# Patient Record
Sex: FEMALE | Race: OTHER | HISPANIC OR LATINO | URBAN - METROPOLITAN AREA
[De-identification: names, ages, dates, MRNs, and addresses within clinical notes are randomized per-mention and may not be internally consistent; named-entity substitution may affect disease eponyms.]

---

## 2017-12-04 ENCOUNTER — EMERGENCY (EMERGENCY)
Facility: HOSPITAL | Age: 52
LOS: 1 days | Discharge: LEFT BEFORE DISCHARGE | End: 2017-12-04
Admitting: EMERGENCY MEDICINE

## 2017-12-04 VITALS
TEMPERATURE: 97 F | DIASTOLIC BLOOD PRESSURE: 74 MMHG | HEIGHT: 60 IN | SYSTOLIC BLOOD PRESSURE: 115 MMHG | WEIGHT: 169.98 LBS | RESPIRATION RATE: 20 BRPM | HEART RATE: 92 BPM | OXYGEN SATURATION: 96 %

## 2017-12-04 NOTE — ED ADULT NURSE REASSESSMENT NOTE - NS ED NURSE REASSESS COMMENT FT1
Pt stating that she feels better and wants to leave. At this time md merlos had just signed on to patient.  Pt drinking ginerale and tolerating with out any issues.  Pt asked to wait while I go get md merlos and left during this time with her daughter. Md merlos made aware

## 2017-12-04 NOTE — ED ADULT NURSE NOTE - OBJECTIVE STATEMENT
Pt axox3 c/o abdominal pain starting today. Pt asking for water and told not to have anything to drink, until she is assessed by md, pt found drinking several gingerales

## 2017-12-04 NOTE — ED ADULT TRIAGE NOTE - CHIEF COMPLAINT QUOTE
Patient BIBA to ED today with c/o abdominal pain and n/v.  Patient was at work today when symptoms started.

## 2017-12-04 NOTE — ED PROVIDER NOTE - PROGRESS NOTE DETAILS
I went to go see patient- was informed by nurse that patient was feelign completely better and wanted to leave.  She LWOBE

## 2019-11-12 NOTE — ED ADULT TRIAGE NOTE - LANGUAGE ASSISTANCE NEEDED
Yes-Patient/Caregiver accepts free interpretation services... Imiquimod Pregnancy And Lactation Text: This medication is Pregnancy Category C. It is unknown if this medication is excreted in breast milk.

## 2020-01-21 ENCOUNTER — APPOINTMENT (OUTPATIENT)
Dept: ANTEPARTUM | Facility: CLINIC | Age: 55
End: 2020-01-21

## 2020-01-28 ENCOUNTER — ASOB RESULT (OUTPATIENT)
Age: 55
End: 2020-01-28

## 2020-01-28 ENCOUNTER — APPOINTMENT (OUTPATIENT)
Dept: ANTEPARTUM | Facility: CLINIC | Age: 55
End: 2020-01-28
Payer: COMMERCIAL

## 2020-01-28 PROCEDURE — 76830 TRANSVAGINAL US NON-OB: CPT

## 2020-01-28 PROCEDURE — 76856 US EXAM PELVIC COMPLETE: CPT | Mod: 59

## 2022-04-02 ENCOUNTER — INPATIENT (INPATIENT)
Facility: HOSPITAL | Age: 57
LOS: 1 days | Discharge: HOME | DRG: 392 | End: 2022-04-04
Attending: INTERNAL MEDICINE | Admitting: INTERNAL MEDICINE
Payer: COMMERCIAL

## 2022-04-02 VITALS
SYSTOLIC BLOOD PRESSURE: 98 MMHG | TEMPERATURE: 99 F | DIASTOLIC BLOOD PRESSURE: 42 MMHG | HEIGHT: 60 IN | OXYGEN SATURATION: 100 % | WEIGHT: 160.06 LBS | RESPIRATION RATE: 18 BRPM | HEART RATE: 84 BPM

## 2022-04-02 DIAGNOSIS — Z98.891 HISTORY OF UTERINE SCAR FROM PREVIOUS SURGERY: Chronic | ICD-10-CM

## 2022-04-02 LAB
ALBUMIN SERPL ELPH-MCNC: 4.2 G/DL — SIGNIFICANT CHANGE UP (ref 3.3–5.2)
ALP SERPL-CCNC: 209 U/L — HIGH (ref 40–120)
ALT FLD-CCNC: 74 U/L — HIGH
ANION GAP SERPL CALC-SCNC: 16 MMOL/L — SIGNIFICANT CHANGE UP (ref 5–17)
AST SERPL-CCNC: 66 U/L — HIGH
BASE EXCESS BLDV CALC-SCNC: -1.9 MMOL/L — SIGNIFICANT CHANGE UP (ref -2–3)
BASOPHILS # BLD AUTO: 0.06 K/UL — SIGNIFICANT CHANGE UP (ref 0–0.2)
BASOPHILS NFR BLD AUTO: 0.4 % — SIGNIFICANT CHANGE UP (ref 0–2)
BILIRUB SERPL-MCNC: 1 MG/DL — SIGNIFICANT CHANGE UP (ref 0.4–2)
BUN SERPL-MCNC: 8.8 MG/DL — SIGNIFICANT CHANGE UP (ref 8–20)
CALCIUM SERPL-MCNC: 9.4 MG/DL — SIGNIFICANT CHANGE UP (ref 8.6–10.2)
CHLORIDE SERPL-SCNC: 95 MMOL/L — LOW (ref 98–107)
CO2 SERPL-SCNC: 22 MMOL/L — SIGNIFICANT CHANGE UP (ref 22–29)
CREAT SERPL-MCNC: 0.39 MG/DL — LOW (ref 0.5–1.3)
EGFR: 116 ML/MIN/1.73M2 — SIGNIFICANT CHANGE UP
EOSINOPHIL # BLD AUTO: 0.12 K/UL — SIGNIFICANT CHANGE UP (ref 0–0.5)
EOSINOPHIL NFR BLD AUTO: 0.9 % — SIGNIFICANT CHANGE UP (ref 0–6)
FLUAV AG NPH QL: SIGNIFICANT CHANGE UP
FLUBV AG NPH QL: SIGNIFICANT CHANGE UP
GAS PNL BLDV: SIGNIFICANT CHANGE UP
GLUCOSE SERPL-MCNC: 208 MG/DL — HIGH (ref 70–99)
HCO3 BLDV-SCNC: 24 MMOL/L — SIGNIFICANT CHANGE UP (ref 22–29)
HCT VFR BLD CALC: 40.6 % — SIGNIFICANT CHANGE UP (ref 34.5–45)
HCT VFR BLD CALC: 46.2 % — HIGH (ref 34.5–45)
HGB BLD-MCNC: 13.7 G/DL — SIGNIFICANT CHANGE UP (ref 11.5–15.5)
HGB BLD-MCNC: 15.7 G/DL — HIGH (ref 11.5–15.5)
IMM GRANULOCYTES NFR BLD AUTO: 0.5 % — SIGNIFICANT CHANGE UP (ref 0–1.5)
LACTATE BLDV-MCNC: 2.7 MMOL/L — HIGH (ref 0.5–2)
LACTATE SERPL-SCNC: 3 MMOL/L — HIGH (ref 0.5–2)
LIDOCAIN IGE QN: 74 U/L — HIGH (ref 22–51)
LYMPHOCYTES # BLD AUTO: 1.03 K/UL — SIGNIFICANT CHANGE UP (ref 1–3.3)
LYMPHOCYTES # BLD AUTO: 7.3 % — LOW (ref 13–44)
MCHC RBC-ENTMCNC: 30.4 PG — SIGNIFICANT CHANGE UP (ref 27–34)
MCHC RBC-ENTMCNC: 30.6 PG — SIGNIFICANT CHANGE UP (ref 27–34)
MCHC RBC-ENTMCNC: 33.7 GM/DL — SIGNIFICANT CHANGE UP (ref 32–36)
MCHC RBC-ENTMCNC: 34 GM/DL — SIGNIFICANT CHANGE UP (ref 32–36)
MCV RBC AUTO: 89.4 FL — SIGNIFICANT CHANGE UP (ref 80–100)
MCV RBC AUTO: 90.6 FL — SIGNIFICANT CHANGE UP (ref 80–100)
MONOCYTES # BLD AUTO: 0.57 K/UL — SIGNIFICANT CHANGE UP (ref 0–0.9)
MONOCYTES NFR BLD AUTO: 4 % — SIGNIFICANT CHANGE UP (ref 2–14)
NEUTROPHILS # BLD AUTO: 12.24 K/UL — HIGH (ref 1.8–7.4)
NEUTROPHILS NFR BLD AUTO: 86.9 % — HIGH (ref 43–77)
PCO2 BLDV: 43 MMHG — HIGH (ref 39–42)
PH BLDV: 7.35 — SIGNIFICANT CHANGE UP (ref 7.32–7.43)
PLATELET # BLD AUTO: 209 K/UL — SIGNIFICANT CHANGE UP (ref 150–400)
PLATELET # BLD AUTO: 319 K/UL — SIGNIFICANT CHANGE UP (ref 150–400)
PO2 BLDV: 46 MMHG — HIGH (ref 25–45)
POTASSIUM SERPL-MCNC: 4.1 MMOL/L — SIGNIFICANT CHANGE UP (ref 3.5–5.3)
POTASSIUM SERPL-SCNC: 4.1 MMOL/L — SIGNIFICANT CHANGE UP (ref 3.5–5.3)
PROT SERPL-MCNC: 7.1 G/DL — SIGNIFICANT CHANGE UP (ref 6.6–8.7)
RBC # BLD: 4.48 M/UL — SIGNIFICANT CHANGE UP (ref 3.8–5.2)
RBC # BLD: 5.17 M/UL — SIGNIFICANT CHANGE UP (ref 3.8–5.2)
RBC # FLD: 13.2 % — SIGNIFICANT CHANGE UP (ref 10.3–14.5)
RBC # FLD: 13.4 % — SIGNIFICANT CHANGE UP (ref 10.3–14.5)
RSV RNA NPH QL NAA+NON-PROBE: SIGNIFICANT CHANGE UP
SAO2 % BLDV: 80.5 % — SIGNIFICANT CHANGE UP
SARS-COV-2 RNA SPEC QL NAA+PROBE: SIGNIFICANT CHANGE UP
SODIUM SERPL-SCNC: 133 MMOL/L — LOW (ref 135–145)
TROPONIN T SERPL-MCNC: <0.01 NG/ML — SIGNIFICANT CHANGE UP (ref 0–0.06)
WBC # BLD: 13.92 K/UL — HIGH (ref 3.8–10.5)
WBC # BLD: 14.09 K/UL — HIGH (ref 3.8–10.5)
WBC # FLD AUTO: 13.92 K/UL — HIGH (ref 3.8–10.5)
WBC # FLD AUTO: 14.09 K/UL — HIGH (ref 3.8–10.5)

## 2022-04-02 PROCEDURE — G1004: CPT

## 2022-04-02 PROCEDURE — 74177 CT ABD & PELVIS W/CONTRAST: CPT | Mod: 26,ME

## 2022-04-02 PROCEDURE — 99285 EMERGENCY DEPT VISIT HI MDM: CPT

## 2022-04-02 RX ORDER — METRONIDAZOLE 500 MG
500 TABLET ORAL ONCE
Refills: 0 | Status: COMPLETED | OUTPATIENT
Start: 2022-04-02 | End: 2022-04-02

## 2022-04-02 RX ORDER — SODIUM CHLORIDE 9 MG/ML
1000 INJECTION INTRAMUSCULAR; INTRAVENOUS; SUBCUTANEOUS ONCE
Refills: 0 | Status: COMPLETED | OUTPATIENT
Start: 2022-04-02 | End: 2022-04-02

## 2022-04-02 RX ORDER — ONDANSETRON 8 MG/1
4 TABLET, FILM COATED ORAL ONCE
Refills: 0 | Status: COMPLETED | OUTPATIENT
Start: 2022-04-02 | End: 2022-04-02

## 2022-04-02 RX ORDER — ACETAMINOPHEN 500 MG
975 TABLET ORAL ONCE
Refills: 0 | Status: COMPLETED | OUTPATIENT
Start: 2022-04-02 | End: 2022-04-02

## 2022-04-02 RX ORDER — MORPHINE SULFATE 50 MG/1
4 CAPSULE, EXTENDED RELEASE ORAL ONCE
Refills: 0 | Status: DISCONTINUED | OUTPATIENT
Start: 2022-04-02 | End: 2022-04-02

## 2022-04-02 RX ORDER — CIPROFLOXACIN LACTATE 400MG/40ML
400 VIAL (ML) INTRAVENOUS ONCE
Refills: 0 | Status: COMPLETED | OUTPATIENT
Start: 2022-04-02 | End: 2022-04-02

## 2022-04-02 RX ORDER — CIPROFLOXACIN LACTATE 400MG/40ML
400 VIAL (ML) INTRAVENOUS ONCE
Refills: 0 | Status: DISCONTINUED | OUTPATIENT
Start: 2022-04-02 | End: 2022-04-02

## 2022-04-02 RX ADMIN — SODIUM CHLORIDE 1000 MILLILITER(S): 9 INJECTION INTRAMUSCULAR; INTRAVENOUS; SUBCUTANEOUS at 22:46

## 2022-04-02 RX ADMIN — ONDANSETRON 4 MILLIGRAM(S): 8 TABLET, FILM COATED ORAL at 14:28

## 2022-04-02 RX ADMIN — Medication 100 MILLIGRAM(S): at 20:18

## 2022-04-02 RX ADMIN — MORPHINE SULFATE 4 MILLIGRAM(S): 50 CAPSULE, EXTENDED RELEASE ORAL at 14:30

## 2022-04-02 RX ADMIN — SODIUM CHLORIDE 1000 MILLILITER(S): 9 INJECTION INTRAMUSCULAR; INTRAVENOUS; SUBCUTANEOUS at 18:00

## 2022-04-02 RX ADMIN — Medication 200 MILLIGRAM(S): at 19:18

## 2022-04-02 RX ADMIN — Medication 400 MILLIGRAM(S): at 21:15

## 2022-04-02 RX ADMIN — SODIUM CHLORIDE 1000 MILLILITER(S): 9 INJECTION INTRAMUSCULAR; INTRAVENOUS; SUBCUTANEOUS at 14:28

## 2022-04-02 RX ADMIN — Medication 500 MILLIGRAM(S): at 21:16

## 2022-04-02 RX ADMIN — SODIUM CHLORIDE 1000 MILLILITER(S): 9 INJECTION INTRAMUSCULAR; INTRAVENOUS; SUBCUTANEOUS at 21:15

## 2022-04-02 RX ADMIN — MORPHINE SULFATE 4 MILLIGRAM(S): 50 CAPSULE, EXTENDED RELEASE ORAL at 15:10

## 2022-04-02 NOTE — ED PROVIDER NOTE - OBJECTIVE STATEMENT
56 yr old f with hx htn dm hld c section p/w abdominal pain x 1 day. pain is all over radiating to her right flank and most intense on the right side . she reports associated nausea and vomiting x 1 no diarrhea dysuria sick contacts or travel.

## 2022-04-02 NOTE — ED PROVIDER NOTE - PATIENT PORTAL LINK FT
You can access the FollowMyHealth Patient Portal offered by [facility] by registering at the following website: [email or web address]. By joining [employer]’s FollowMyHealth portal, you will also be able to view your health information using other applications (apps) compatible with our system.

## 2022-04-02 NOTE — ED PROVIDER NOTE - PROGRESS NOTE DETAILS
pain improved will give iv antibiotics, recheck lactate wbc after ivf  po trial intact no vomiting or worsening pain with eating

## 2022-04-02 NOTE — ED PROVIDER NOTE - CARE PLAN
Principal Discharge DX:	Acute diverticulitis  Secondary Diagnosis:	Right sided abdominal pain  Secondary Diagnosis:	DM (diabetes mellitus)  Secondary Diagnosis:	HTN (hypertension)  Secondary Diagnosis:	HLD (hyperlipidemia)  Secondary Diagnosis:	NAFLD (nonalcoholic fatty liver disease)  Secondary Diagnosis:	UTI (urinary tract infection)   1

## 2022-04-02 NOTE — ED ADULT TRIAGE NOTE - CHIEF COMPLAINT QUOTE
pt c/o abdominal pain since yesterday and vomiting, pt slightly hypotensive in traige and feels like she is going to pass out per pts son pt c/o abdominal pain since yesterday and vomiting, pt slightly hypotensive in triage and pt feels like she is going to pass out per pts son pt c/o abdominal pain since yesterday and vomiting, pt slightly hypotensive in triage and pt feels like she is going to pass out,

## 2022-04-02 NOTE — ED PROVIDER NOTE - SECONDARY DIAGNOSIS.
HTN (hypertension) NAFLD (nonalcoholic fatty liver disease) Right sided abdominal pain DM (diabetes mellitus) HLD (hyperlipidemia) UTI (urinary tract infection)

## 2022-04-03 ENCOUNTER — TRANSCRIPTION ENCOUNTER (OUTPATIENT)
Age: 57
End: 2022-04-03

## 2022-04-03 DIAGNOSIS — K57.92 DIVERTICULITIS OF INTESTINE, PART UNSPECIFIED, WITHOUT PERFORATION OR ABSCESS WITHOUT BLEEDING: ICD-10-CM

## 2022-04-03 LAB
APPEARANCE UR: CLEAR — SIGNIFICANT CHANGE UP
BILIRUB UR-MCNC: NEGATIVE — SIGNIFICANT CHANGE UP
COLOR SPEC: YELLOW — SIGNIFICANT CHANGE UP
DIFF PNL FLD: NEGATIVE — SIGNIFICANT CHANGE UP
GLUCOSE BLDC GLUCOMTR-MCNC: 135 MG/DL — HIGH (ref 70–99)
GLUCOSE BLDC GLUCOMTR-MCNC: 184 MG/DL — HIGH (ref 70–99)
GLUCOSE UR QL: 250 MG/DL
HCT VFR BLD CALC: 37.2 % — SIGNIFICANT CHANGE UP (ref 34.5–45)
HGB BLD-MCNC: 12.6 G/DL — SIGNIFICANT CHANGE UP (ref 11.5–15.5)
KETONES UR-MCNC: NEGATIVE — SIGNIFICANT CHANGE UP
LEUKOCYTE ESTERASE UR-ACNC: ABNORMAL
MCHC RBC-ENTMCNC: 30.4 PG — SIGNIFICANT CHANGE UP (ref 27–34)
MCHC RBC-ENTMCNC: 33.9 GM/DL — SIGNIFICANT CHANGE UP (ref 32–36)
MCV RBC AUTO: 89.9 FL — SIGNIFICANT CHANGE UP (ref 80–100)
NITRITE UR-MCNC: NEGATIVE — SIGNIFICANT CHANGE UP
PH UR: 7 — SIGNIFICANT CHANGE UP (ref 5–8)
PLATELET # BLD AUTO: 171 K/UL — SIGNIFICANT CHANGE UP (ref 150–400)
PROT UR-MCNC: NEGATIVE — SIGNIFICANT CHANGE UP
RBC # BLD: 4.14 M/UL — SIGNIFICANT CHANGE UP (ref 3.8–5.2)
RBC # FLD: 13.4 % — SIGNIFICANT CHANGE UP (ref 10.3–14.5)
SP GR SPEC: 1 — LOW (ref 1.01–1.02)
UROBILINOGEN FLD QL: NEGATIVE MG/DL — SIGNIFICANT CHANGE UP
WBC # BLD: 12.31 K/UL — HIGH (ref 3.8–10.5)
WBC # FLD AUTO: 12.31 K/UL — HIGH (ref 3.8–10.5)

## 2022-04-03 PROCEDURE — 99236 HOSP IP/OBS SAME DATE HI 85: CPT

## 2022-04-03 PROCEDURE — 99223 1ST HOSP IP/OBS HIGH 75: CPT

## 2022-04-03 RX ORDER — SODIUM CHLORIDE 9 MG/ML
1000 INJECTION, SOLUTION INTRAVENOUS
Refills: 0 | Status: DISCONTINUED | OUTPATIENT
Start: 2022-04-03 | End: 2022-04-04

## 2022-04-03 RX ORDER — MOXIFLOXACIN HYDROCHLORIDE TABLETS, 400 MG 400 MG/1
1 TABLET, FILM COATED ORAL
Qty: 20 | Refills: 0
Start: 2022-04-03 | End: 2022-04-12

## 2022-04-03 RX ORDER — MORPHINE SULFATE 50 MG/1
1 CAPSULE, EXTENDED RELEASE ORAL EVERY 6 HOURS
Refills: 0 | Status: DISCONTINUED | OUTPATIENT
Start: 2022-04-03 | End: 2022-04-04

## 2022-04-03 RX ORDER — PANTOPRAZOLE SODIUM 20 MG/1
40 TABLET, DELAYED RELEASE ORAL
Refills: 0 | Status: DISCONTINUED | OUTPATIENT
Start: 2022-04-03 | End: 2022-04-04

## 2022-04-03 RX ORDER — ENOXAPARIN SODIUM 100 MG/ML
40 INJECTION SUBCUTANEOUS EVERY 24 HOURS
Refills: 0 | Status: DISCONTINUED | OUTPATIENT
Start: 2022-04-03 | End: 2022-04-04

## 2022-04-03 RX ORDER — ACETAMINOPHEN 500 MG
650 TABLET ORAL EVERY 6 HOURS
Refills: 0 | Status: DISCONTINUED | OUTPATIENT
Start: 2022-04-03 | End: 2022-04-04

## 2022-04-03 RX ORDER — DEXTROSE 50 % IN WATER 50 %
25 SYRINGE (ML) INTRAVENOUS ONCE
Refills: 0 | Status: DISCONTINUED | OUTPATIENT
Start: 2022-04-03 | End: 2022-04-04

## 2022-04-03 RX ORDER — GLUCAGON INJECTION, SOLUTION 0.5 MG/.1ML
1 INJECTION, SOLUTION SUBCUTANEOUS ONCE
Refills: 0 | Status: DISCONTINUED | OUTPATIENT
Start: 2022-04-03 | End: 2022-04-04

## 2022-04-03 RX ORDER — CIPROFLOXACIN LACTATE 400MG/40ML
400 VIAL (ML) INTRAVENOUS EVERY 12 HOURS
Refills: 0 | Status: DISCONTINUED | OUTPATIENT
Start: 2022-04-03 | End: 2022-04-03

## 2022-04-03 RX ORDER — HYDROCHLOROTHIAZIDE 25 MG
25 TABLET ORAL DAILY
Refills: 0 | Status: DISCONTINUED | OUTPATIENT
Start: 2022-04-03 | End: 2022-04-03

## 2022-04-03 RX ORDER — METOPROLOL TARTRATE 50 MG
50 TABLET ORAL DAILY
Refills: 0 | Status: DISCONTINUED | OUTPATIENT
Start: 2022-04-03 | End: 2022-04-04

## 2022-04-03 RX ORDER — INSULIN LISPRO 100/ML
VIAL (ML) SUBCUTANEOUS
Refills: 0 | Status: DISCONTINUED | OUTPATIENT
Start: 2022-04-03 | End: 2022-04-04

## 2022-04-03 RX ORDER — METRONIDAZOLE 500 MG
500 TABLET ORAL EVERY 8 HOURS
Refills: 0 | Status: DISCONTINUED | OUTPATIENT
Start: 2022-04-03 | End: 2022-04-04

## 2022-04-03 RX ORDER — SIMVASTATIN 20 MG/1
40 TABLET, FILM COATED ORAL AT BEDTIME
Refills: 0 | Status: DISCONTINUED | OUTPATIENT
Start: 2022-04-03 | End: 2022-04-04

## 2022-04-03 RX ORDER — CIPROFLOXACIN LACTATE 400MG/40ML
400 VIAL (ML) INTRAVENOUS EVERY 12 HOURS
Refills: 0 | Status: DISCONTINUED | OUTPATIENT
Start: 2022-04-03 | End: 2022-04-04

## 2022-04-03 RX ORDER — MORPHINE SULFATE 50 MG/1
2 CAPSULE, EXTENDED RELEASE ORAL ONCE
Refills: 0 | Status: DISCONTINUED | OUTPATIENT
Start: 2022-04-03 | End: 2022-04-03

## 2022-04-03 RX ORDER — DEXTROSE 50 % IN WATER 50 %
12.5 SYRINGE (ML) INTRAVENOUS ONCE
Refills: 0 | Status: DISCONTINUED | OUTPATIENT
Start: 2022-04-03 | End: 2022-04-04

## 2022-04-03 RX ORDER — METRONIDAZOLE 500 MG
1 TABLET ORAL
Qty: 30 | Refills: 0
Start: 2022-04-03 | End: 2022-04-12

## 2022-04-03 RX ORDER — INSULIN LISPRO 100/ML
3 VIAL (ML) SUBCUTANEOUS
Refills: 0 | Status: DISCONTINUED | OUTPATIENT
Start: 2022-04-03 | End: 2022-04-04

## 2022-04-03 RX ORDER — METFORMIN HYDROCHLORIDE 850 MG/1
500 TABLET ORAL
Refills: 0 | Status: DISCONTINUED | OUTPATIENT
Start: 2022-04-03 | End: 2022-04-03

## 2022-04-03 RX ORDER — ONDANSETRON 8 MG/1
4 TABLET, FILM COATED ORAL EVERY 6 HOURS
Refills: 0 | Status: DISCONTINUED | OUTPATIENT
Start: 2022-04-03 | End: 2022-04-04

## 2022-04-03 RX ORDER — DEXTROSE 50 % IN WATER 50 %
15 SYRINGE (ML) INTRAVENOUS ONCE
Refills: 0 | Status: DISCONTINUED | OUTPATIENT
Start: 2022-04-03 | End: 2022-04-04

## 2022-04-03 RX ORDER — MORPHINE SULFATE 50 MG/1
2 CAPSULE, EXTENDED RELEASE ORAL EVERY 6 HOURS
Refills: 0 | Status: DISCONTINUED | OUTPATIENT
Start: 2022-04-03 | End: 2022-04-04

## 2022-04-03 RX ORDER — METRONIDAZOLE 500 MG
500 TABLET ORAL EVERY 8 HOURS
Refills: 0 | Status: DISCONTINUED | OUTPATIENT
Start: 2022-04-03 | End: 2022-04-03

## 2022-04-03 RX ADMIN — SODIUM CHLORIDE 1000 MILLILITER(S): 9 INJECTION INTRAMUSCULAR; INTRAVENOUS; SUBCUTANEOUS at 00:12

## 2022-04-03 RX ADMIN — MORPHINE SULFATE 2 MILLIGRAM(S): 50 CAPSULE, EXTENDED RELEASE ORAL at 00:46

## 2022-04-03 RX ADMIN — Medication 50 MILLIGRAM(S): at 05:27

## 2022-04-03 RX ADMIN — Medication 975 MILLIGRAM(S): at 00:46

## 2022-04-03 RX ADMIN — Medication 975 MILLIGRAM(S): at 00:18

## 2022-04-03 RX ADMIN — SODIUM CHLORIDE 75 MILLILITER(S): 9 INJECTION, SOLUTION INTRAVENOUS at 20:09

## 2022-04-03 RX ADMIN — Medication 200 MILLIGRAM(S): at 18:03

## 2022-04-03 RX ADMIN — Medication 100 MILLIGRAM(S): at 05:27

## 2022-04-03 RX ADMIN — Medication 2: at 18:04

## 2022-04-03 RX ADMIN — Medication 100 MILLIGRAM(S): at 14:12

## 2022-04-03 RX ADMIN — Medication 200 MILLIGRAM(S): at 05:27

## 2022-04-03 RX ADMIN — Medication 500 MILLIGRAM(S): at 06:30

## 2022-04-03 RX ADMIN — MORPHINE SULFATE 2 MILLIGRAM(S): 50 CAPSULE, EXTENDED RELEASE ORAL at 08:42

## 2022-04-03 RX ADMIN — Medication 400 MILLIGRAM(S): at 06:30

## 2022-04-03 RX ADMIN — Medication 10 MILLIGRAM(S): at 05:27

## 2022-04-03 RX ADMIN — SIMVASTATIN 40 MILLIGRAM(S): 20 TABLET, FILM COATED ORAL at 22:01

## 2022-04-03 RX ADMIN — Medication 650 MILLIGRAM(S): at 17:38

## 2022-04-03 RX ADMIN — MORPHINE SULFATE 1 MILLIGRAM(S): 50 CAPSULE, EXTENDED RELEASE ORAL at 14:13

## 2022-04-03 RX ADMIN — MORPHINE SULFATE 2 MILLIGRAM(S): 50 CAPSULE, EXTENDED RELEASE ORAL at 00:18

## 2022-04-03 RX ADMIN — ENOXAPARIN SODIUM 40 MILLIGRAM(S): 100 INJECTION SUBCUTANEOUS at 22:55

## 2022-04-03 RX ADMIN — Medication 25 MILLIGRAM(S): at 05:27

## 2022-04-03 RX ADMIN — Medication 100 MILLIGRAM(S): at 22:01

## 2022-04-03 RX ADMIN — METFORMIN HYDROCHLORIDE 500 MILLIGRAM(S): 850 TABLET ORAL at 05:27

## 2022-04-03 RX ADMIN — Medication 650 MILLIGRAM(S): at 19:25

## 2022-04-03 NOTE — DISCHARGE NOTE PROVIDER - PROVIDER TOKENS
PROVIDER:[TOKEN:[[id number]]] PROVIDER:[TOKEN:[[id number]]],PROVIDER:[TOKEN:[[id number]]]

## 2022-04-03 NOTE — DISCHARGE NOTE PROVIDER - CARE PROVIDERS DIRECT ADDRESSES
,[email or web address]

## 2022-04-03 NOTE — DISCHARGE NOTE PROVIDER - ATTENDING DISCHARGE PHYSICAL EXAMINATION:
ICU Vital Signs Last 24 Hrs  T(C): 37.1 (04 Apr 2022 11:24), Max: 37.9 (03 Apr 2022 23:19)  T(F): 98.8 (04 Apr 2022 11:24), Max: 100.3 (03 Apr 2022 23:19)  HR: 98 (04 Apr 2022 11:24) (69 - 113)  BP: 115/71 (04 Apr 2022 11:24) (115/71 - 147/81)  BP(mean): 93 (03 Apr 2022 20:18) (93 - 93)  ABP: --  ABP(mean): --  RR: 18 (04 Apr 2022 11:24) (16 - 20)  SpO2: 96% (04 Apr 2022 11:24) (95% - 96%)    General: obese; in no acute distress, intermittent pain med requirement  HEENT: MMM, conjunctiva pink and sclera anicteric.  Lungs: Clear bilaterally  Cor: RRR S1, S2 only  Gastrointestinal: Soft, obese, significant right sided abdominal tenderness to superficial palpation, non-distended; Normal bowel sounds; No rebound or guarding or HSM.  JACE: Unable to perform where pt. located in ED.  Extremities: Normal range of motion, No clubbing, cyanosis or edema  Neurological: Alert and oriented x3  Skin: Warm and dry. No obvious rash

## 2022-04-03 NOTE — DISCHARGE NOTE PROVIDER - NSDCMRMEDTOKEN_GEN_ALL_CORE_FT
Cipro 500 mg oral tablet: 1 tab(s) orally 2 times a day   metroNIDAZOLE 500 mg oral tablet: 1 tab(s) orally every 8 hours    acetaminophen 325 mg oral tablet: 2 tab(s) orally every 6 hours, As needed, Mild Pain (1 - 3)  Cipro 500 mg oral tablet: 1 tab(s) orally 2 times a day   enalapril 10 mg oral tablet: 1 tab(s) orally once a day  Maalox Advanced Regular Strength oral suspension: 10 milliliter(s) orally 4 times a day (after meals and at bedtime), As Needed  metoprolol succinate 50 mg oral tablet, extended release: 1 tab(s) orally once a day  metroNIDAZOLE 500 mg oral tablet: 1 tab(s) orally every 8 hours   pantoprazole 40 mg oral delayed release tablet: 1 tab(s) orally once a day (before a meal)  simvastatin 40 mg oral tablet: 1 tab(s) orally once a day (at bedtime)

## 2022-04-03 NOTE — ED CDU PROVIDER INITIAL DAY NOTE - OBJECTIVE STATEMENT
55yo F pmhx HTN, HLD, DMII presenting to ED c/o rlq abdominal pain x 1 day. Pain worse on right side, radiating to right flank with associated vomiting x 1 episode. Found in ED to have cecal diverticulitis, tx with cipro and flagyl. Initial lactate 2.7, repeated after 1L IVF, increased to 3.0. Pt tolerating PO intake but still having abd pain. Decision made by ED team to keep pt in obs to trend labs, give IV abx and rpt lactate. Lactate improved to 2.4 after fluids. Febrile while in ED, given tylenol. Denies diarrhea, melena, cp, sob, dizziness, recent travel, sick contacts.

## 2022-04-03 NOTE — DISCHARGE NOTE PROVIDER - NSDCFUADDAPPT_GEN_ALL_CORE_FT
Follow with GI Dr Cohen in 1 week.   You will need outpatient colonoscopy  Follow with Dr Canela- for fatty liver disease

## 2022-04-03 NOTE — PATIENT PROFILE ADULT - FALL HARM RISK - UNIVERSAL INTERVENTIONS
Bed in lowest position, wheels locked, appropriate side rails in place/Call bell, personal items and telephone in reach/Instruct patient to call for assistance before getting out of bed or chair/Non-slip footwear when patient is out of bed/[place] to call system/Physically safe environment - no spills, clutter or unnecessary equipment/Purposeful Proactive Rounding/Room/bathroom lighting operational, light cord in reach

## 2022-04-03 NOTE — CONSULT NOTE ADULT - SUBJECTIVE AND OBJECTIVE BOX
Patient is a 56y old  Female who presents with a chief complaint of Right sided abdominal pain    HPI: 55 y/o female presents with 2 day h/o right sided abdominal pain. Pt is an extremely poor historian and appears to be of limited intelligence as even with speaking to her in Japanese she failed to understand simple questions such as how long she has had this pain or if she ever had this type of pain before or if she ever had a colonoscopy. Her son who was with her at the bedside was equally useless in terms of obtaining history from the pt. even though he spoke English well as he did not appear to understand these basic questions either, CT here showed a mild uncomplicated right sided diverticulitis as well as a cirrhotic appearing liver likely secondary to NAFLD as she is obese and diabetic.      REVIEW OF SYSTEMS: Limited for reasons outlined above  Constitutional: No fever, weight loss or fatigue  ENMT:  No difficulty hearing, tinnitus, vertigo; No sinus or throat pain  Respiratory: No cough, wheezing, chills or hemoptysis  Cardiovascular: No chest pain, palpitations, dizziness or leg swelling  Gastrointestinal: As per HPI. Worsening abdominal pain with defecation.  Skin: No itching, burning, rashes or lesions   Musculoskeletal: No joint pain or swelling; No muscle, back or extremity pain  Patient has no cardiopulmonary, peripheral vascular, musculoskeletal, dermatological, neurological, gynecological or psychological symptoms or complaints at this time.    PAST MEDICAL & SURGICAL HISTORY:  Diabetes    Hypertension    Hyperlipidemia    H/O  section    Obesity        FAMILY HISTORY:  No pertinent family history in first degree relatives        SOCIAL HISTORY:  Smoking Status: [ ] Current, [ ] Former, [x ] Never  Pack Years: N/A. No ETOH or drug abuse history  as noted in her ED chart.    MEDICATIONS:  MEDICATIONS  (STANDING):  ciprofloxacin   IVPB 400 milliGRAM(s) IV Intermittent every 12 hours  enalapril 10 milliGRAM(s) Oral daily  hydrochlorothiazide 25 milliGRAM(s) Oral daily  metFORMIN 500 milliGRAM(s) Oral two times a day  metoprolol succinate ER 50 milliGRAM(s) Oral daily  metroNIDAZOLE  IVPB 500 milliGRAM(s) IV Intermittent every 8 hours  simvastatin 40 milliGRAM(s) Oral at bedtime    MEDICATIONS  (PRN):      Allergies    penicillin (Unknown)    Intolerances        Vital Signs Last 24 Hrs  T(C): 37.5 (2022 07:52), Max: 38.3 (2022 00:02)  T(F): 99.5 (2022 07:52), Max: 100.9 (2022 00:02)  HR: 93 (2022 07:52) (84 - 124)  BP: 116/55 (2022 07:52) (98/42 - 151/70)  BP(mean): --  RR: 18 (2022 07:52) (18 - 18)  SpO2: 94% (2022 07:52) (94% - 100%)        PHYSICAL EXAM:    General: Well developed; obese; in no acute distress  HEENT: MMM, conjunctiva pink and sclera anicteric.  Lungs: Clear bilaterally  Cor: RRR S1, S2 only  Gastrointestinal: Soft, obese, significant right sided abdominal tenderness to superficial palpation, non-distended; Normal bowel sounds; No rebound or guarding or HSM.  JACE: Unable to perform where pt. located in ED.  Extremities: Normal range of motion, No clubbing, cyanosis or edema  Neurological: Alert and oriented x3  Skin: Warm and dry. No obvious rash      LABS:                        12.6   12.31 )-----------( 171      ( 2022 05:27 )             37.2         133<L>  |  95<L>  |  8.8  ----------------------------<  208<H>  4.1   |  22.0  |  0.39<L>    Ca    9.4      2022 14:24    TPro  7.1  /  Alb  4.2  /  TBili  1.0  /  DBili  x   /  AST  66<H>  /  ALT  74<H>  /  AlkPhos  209<H>            RADIOLOGY & ADDITIONAL STUDIES:   CT results noted above. Patient is a 56y old  Female who presents with a chief complaint of Right sided abdominal pain    HPI: 57 y/o female presents with 2 day h/o right sided abdominal pain. Pt is an extremely poor historian and appears to be of limited intelligence as even with speaking to her in Latvian she failed to understand simple questions such as how long she has had this pain or if she ever had this type of pain before or if she ever had a colonoscopy. Her son who was with her at the bedside was equally useless in terms of obtaining history from the pt. even though he spoke English well, as he did not appear to understand these basic questions either. CT here showed a mild uncomplicated right sided diverticulitis as well as a cirrhotic appearing liver likely secondary to NAFLD as she is obese and diabetic.      REVIEW OF SYSTEMS: Limited for reasons outlined above  Constitutional: No fever, weight loss or fatigue  ENMT:  No difficulty hearing, tinnitus, vertigo; No sinus or throat pain  Respiratory: No cough, wheezing, chills or hemoptysis  Cardiovascular: No chest pain, palpitations, dizziness or leg swelling  Gastrointestinal: As per HPI. Worsening abdominal pain with defecation.  Skin: No itching, burning, rashes or lesions   Musculoskeletal: No joint pain or swelling; No muscle, back or extremity pain  Patient has no cardiopulmonary, peripheral vascular, musculoskeletal, dermatological, neurological, gynecological or psychological symptoms or complaints at this time.    PAST MEDICAL & SURGICAL HISTORY:  Diabetes    Hypertension    Hyperlipidemia    H/O  section    Obesity        FAMILY HISTORY:  No pertinent family history in first degree relatives        SOCIAL HISTORY:  Smoking Status: [ ] Current, [ ] Former, [x ] Never  Pack Years: N/A. No ETOH or drug abuse history  as noted in her ED chart.    MEDICATIONS:  MEDICATIONS  (STANDING):  ciprofloxacin   IVPB 400 milliGRAM(s) IV Intermittent every 12 hours  enalapril 10 milliGRAM(s) Oral daily  hydrochlorothiazide 25 milliGRAM(s) Oral daily  metFORMIN 500 milliGRAM(s) Oral two times a day  metoprolol succinate ER 50 milliGRAM(s) Oral daily  metroNIDAZOLE  IVPB 500 milliGRAM(s) IV Intermittent every 8 hours  simvastatin 40 milliGRAM(s) Oral at bedtime    MEDICATIONS  (PRN):      Allergies    penicillin (Unknown)    Intolerances        Vital Signs Last 24 Hrs  T(C): 37.5 (2022 07:52), Max: 38.3 (2022 00:02)  T(F): 99.5 (2022 07:52), Max: 100.9 (2022 00:02)  HR: 93 (2022 07:52) (84 - 124)  BP: 116/55 (2022 07:52) (98/42 - 151/70)  BP(mean): --  RR: 18 (2022 07:52) (18 - 18)  SpO2: 94% (2022 07:52) (94% - 100%)        PHYSICAL EXAM:    General: Well developed; obese; in no acute distress  HEENT: MMM, conjunctiva pink and sclera anicteric.  Lungs: Clear bilaterally  Cor: RRR S1, S2 only  Gastrointestinal: Soft, obese, significant right sided abdominal tenderness to superficial palpation, non-distended; Normal bowel sounds; No rebound or guarding or HSM.  JACE: Unable to perform where pt. located in ED.  Extremities: Normal range of motion, No clubbing, cyanosis or edema  Neurological: Alert and oriented x3  Skin: Warm and dry. No obvious rash      LABS:                        12.6   12.31 )-----------( 171      ( 2022 05:27 )             37.2         133<L>  |  95<L>  |  8.8  ----------------------------<  208<H>  4.1   |  22.0  |  0.39<L>    Ca    9.4      2022 14:24    TPro  7.1  /  Alb  4.2  /  TBili  1.0  /  DBili  x   /  AST  66<H>  /  ALT  74<H>  /  AlkPhos  209<H>            RADIOLOGY & ADDITIONAL STUDIES:   CT results noted above.

## 2022-04-03 NOTE — ED CDU PROVIDER INITIAL DAY NOTE - MEDICAL DECISION MAKING DETAILS
57yo F presenting with cecal diverticulitis. Rpt cbc in AM, IV abx. fever control. likely dc in morning.

## 2022-04-03 NOTE — CONSULT NOTE ADULT - ASSESSMENT
Right sided abdominal pain with what appears to be uncomplicated right sided diverticulitis on CT scan. Pt. also with incidental finding of cirrhosis likely secondary to NAFLD from obesity and DM and not responsible for her right sided abdominal pain and tenderness. Given the degree of her tenderness she should be admitted with consideration of ID consult for differnt antibiotics other than Cipro and Metronidazole which she is presently receiving. OK for CLD. Eventual OPT colonoscopy in 6 to 8 weeks once this diverticulitis has resolved. It appears doubtful that the pt. has ever had a colonoscopy. Repeat labs ordered for the AM. Right sided abdominal pain with what appears to be uncomplicated right sided diverticulitis on CT scan. Pt. also with incidental finding of cirrhosis likely secondary to NAFLD from obesity and DM and not responsible for her right sided abdominal pain and tenderness. Given the degree of her tenderness she should be admitted with consideration of ID consult for different antibiotics other than Cipro and Metronidazole which she is presently receiving. OK for CLD. Eventual OPT colonoscopy in 6 to 8 weeks once this diverticulitis has resolved. It appears doubtful that the pt. has ever had a colonoscopy. Repeat labs ordered for the AM.

## 2022-04-03 NOTE — DISCHARGE NOTE PROVIDER - NSDCCPCAREPLAN_GEN_ALL_CORE_FT
PRINCIPAL DISCHARGE DIAGNOSIS  Diagnosis: Diverticulitis  Assessment and Plan of Treatment: right, cecal      SECONDARY DISCHARGE DIAGNOSES  Diagnosis: Right sided abdominal pain  Assessment and Plan of Treatment:     Diagnosis: DM (diabetes mellitus)  Assessment and Plan of Treatment:     Diagnosis: HTN (hypertension)  Assessment and Plan of Treatment:     Diagnosis: HLD (hyperlipidemia)  Assessment and Plan of Treatment:      PRINCIPAL DISCHARGE DIAGNOSIS  Diagnosis: Diverticulitis  Assessment and Plan of Treatment: right, cecal      SECONDARY DISCHARGE DIAGNOSES  Diagnosis: Right sided abdominal pain  Assessment and Plan of Treatment:     Diagnosis: DM (diabetes mellitus)  Assessment and Plan of Treatment:     Diagnosis: HTN (hypertension)  Assessment and Plan of Treatment:     Diagnosis: HLD (hyperlipidemia)  Assessment and Plan of Treatment:     Diagnosis: NAFLD (nonalcoholic fatty liver disease)  Assessment and Plan of Treatment:      PRINCIPAL DISCHARGE DIAGNOSIS  Diagnosis: Diverticulitis  Assessment and Plan of Treatment: right, cecal  cont antibiotics  follow with GI  maintain low fiber diet      SECONDARY DISCHARGE DIAGNOSES  Diagnosis: Right sided abdominal pain  Assessment and Plan of Treatment:     Diagnosis: DM (diabetes mellitus)  Assessment and Plan of Treatment: follow Diabetic diet and cont your diabetic medications    Diagnosis: HTN (hypertension)  Assessment and Plan of Treatment:     Diagnosis: HLD (hyperlipidemia)  Assessment and Plan of Treatment:     Diagnosis: NAFLD (nonalcoholic fatty liver disease)  Assessment and Plan of Treatment: follow with liver doctor    Diagnosis: UTI (urinary tract infection)  Assessment and Plan of Treatment: urine cultures are testing. follow results with doctor visit  the antibiotics works for your UTI also

## 2022-04-03 NOTE — DISCHARGE NOTE PROVIDER - HOSPITAL COURSE
admitted with diverticulitis, abd pain  pt requesting PO antibx and wants to go home  it will be AMA   admitted with diverticulitis, abd pain  her nausea/ vomits resolved, WBC trending down well, pain is occasional only.   adv diet now and transition to oral antibiotics to facilitate going home.  pt and son both in agreement with diet modifications and to f/u GI promptly.    Medically stable and agreeable with discharge and follow up plan. Patient was advised to return to ED if any symptoms occur or worsen.  TIME 43 MIN

## 2022-04-03 NOTE — ED ADULT NURSE REASSESSMENT NOTE - NS ED NURSE REASSESS COMMENT FT1
Assuming care from previous RN Payam, pt AOx4, denies SOB, resp even and unlabored, skin warm and dry, color good, denies pain/n/v, patent 20G IV in left AC, pt on observation at this time, pt aware of plan of care, will continue to monitor.

## 2022-04-03 NOTE — ED CDU PROVIDER INITIAL DAY NOTE - NS ED ATTENDING STATEMENT MOD
This was a shared visit with the LADONNA. I reviewed and verified the documentation and independently performed the documented:

## 2022-04-03 NOTE — ED CDU PROVIDER INITIAL DAY NOTE - PHYSICAL EXAMINATION
Gen: No acute distress, non toxic  HEENT: Mucous membranes moist, pink conjunctivae, EOMI  CV: RRR, nl s1/s2.  Resp: CTAB, normal rate and effort  GI: Abdomen soft, +Right sided abd tenderness. No rebound, no guarding  : No CVAT  Neuro: A&O x 3, moving all 4 extremities  MSK: No spine or joint tenderness to palpation  Skin: No rashes. intact and perfused.

## 2022-04-03 NOTE — ED ADULT NURSE REASSESSMENT NOTE - NS ED NURSE REASSESS COMMENT FT1
Pt c/o RUQ abdominal pain.  Medicine PA contacted.  PRN pain medication requested. Pending orders. CTM

## 2022-04-03 NOTE — H&P ADULT - NSHPPHYSICALEXAM_GEN_ALL_CORE
Vital Signs Last 24 Hrs  T(C): 37.5 (04-03-22 @ 07:52), Max: 38.3 (04-03-22 @ 00:02)  T(F): 99.5 (04-03-22 @ 07:52), Max: 100.9 (04-03-22 @ 00:02)  HR: 93 (04-03-22 @ 07:52) (93 - 124)  BP: 116/55 (04-03-22 @ 07:52) (116/55 - 151/70)  BP(mean): --  RR: 18 (04-03-22 @ 07:52) (18 - 18)  SpO2: 94% (04-03-22 @ 07:52) (94% - 97%)    General: obese; in no acute distress, intermittent pain med requirement  HEENT: MMM, conjunctiva pink and sclera anicteric.  Lungs: Clear bilaterally  Cor: RRR S1, S2 only  Gastrointestinal: Soft, obese, significant right sided abdominal tenderness to superficial palpation, non-distended; Normal bowel sounds; No rebound or guarding or HSM.  JACE: Unable to perform where pt. located in ED.  Extremities: Normal range of motion, No clubbing, cyanosis or edema  Neurological: Alert and oriented x3  Skin: Warm and dry. No obvious rash

## 2022-04-03 NOTE — ED CDU PROVIDER INITIAL DAY NOTE - ATTENDING CONTRIBUTION TO CARE
Nona DODGE- 55 Y/O F with h/o dm, htn, hld placed in cdu for diverticulitis. No fever , chills. Pt still has pain  and returns once pain meds wear off. Pt has no known liver problems, dysuria.    Pt is alert, well appearing female, s1s2 normal reg, b/l clear breath sounds, abd s1s2 normal reg, b/l clear breath sounds, abd soft, diffuse tenderness, normal bowel sounds, no cvat b/l, neuro exam aox3, cn 2-12 intact, no focal deficits, skin warm, dry, good turgor    plan to call GI consult, recommend admission  as pt found to have NAFLD and rt sided diverticulitis, will admit to medicine

## 2022-04-03 NOTE — DISCHARGE NOTE PROVIDER - CARE PROVIDER_API CALL
Payam Cohen)  Gastroenterology; Internal Medicine  [address] [facility] [facility] [address]  [place] [address]  Phone: [phone]  Fax: [phone]  Follow Up Time:    Payam Cohen)  Gastroenterology; Internal Medicine  [address] [facility], [address]  [place] [address]  Phone: [phone]  Fax: [phone]  Follow Up Time:     Erika Canela)  Internal Medicine  [address] [place] [address]  Phone: [phone]  Fax: [phone]  Follow Up Time:

## 2022-04-03 NOTE — ED CDU PROVIDER DISPOSITION NOTE - ATTENDING CONTRIBUTION TO CARE
Nona DODGE- 55 Y/O F with h/o dm, htn, hld placed in cdu for diverticulitis. No fever , chills. Pt still has pain  and returns once pain meds wear off. Pt has no known liver problems, dysuria.    Pt is alert, well appearing female, s1s2 normal reg, b/l clear breath sounds, abd s1s2 normal reg, b/l clear breath sounds, abd soft, diffuse tenderness, normal bowel sounds, no cvat b/l, neuro exam aox3, cn 2-12 intact, no focal deficits, skin warm, dry, good turgor    plan to call GI consult, recommend admission  as pt found to have NAFLD and rt sided diverticulitis, will admit to medicine.

## 2022-04-03 NOTE — CHART NOTE - NSCHARTNOTEFT_GEN_A_CORE
PA late entry note    Seen at bedside because Pt expressing she wants to leave AMA.  #[id number] utilized. Explained reasoning for admission, test results, labs and hospital course so far and plan of care to Pt and Daughter Vannesa at bedside. Pt understands and agrees to stay.

## 2022-04-03 NOTE — DISCHARGE NOTE PROVIDER - NSDCFUADDINST_GEN_ALL_CORE_FT
Ambulate well  Avoid fiber rich, fatty foods. Avoid seeds and nuts until GI follow up  Drink plenty of liquids

## 2022-04-03 NOTE — ED CDU PROVIDER INITIAL DAY NOTE - NS ED ROS FT
Gen: denies fever, chills, fatigue, weight loss  Skin: denies rashes, laceration, bruising  HEENT: denies visual changes, ear pain, nasal congestion, throat pain  Respiratory: denies GONZALEZ, SOB, cough, wheezing  Cardiovascular: denies chest pain, palpitations, diaphoresis, LE edema  GI: +Abd pain, +N/V (resolved). Denies diarrhea.   : denies dysuria, frequency, urgency, bowel/bladder incontinence  MSK: denies joint swelling/pain, back pain, neck pain  Neuro: denies headache, dizziness, weakness, numbness  Psych: denies anxiety, depression, SI/HI, visual/auditory hallucinations

## 2022-04-03 NOTE — H&P ADULT - ASSESSMENT
55 y/o female with PMH of DM, HLD, HTN presents with 2 day h/o right sided abdominal pain.  CT here showed a mild uncomplicated right sided diverticulitis as well as a cirrhotic appearing liver likely secondary to NAFLD     # Right Abd pain sec to Right Cecal diverticulitis  treated with CIpro + Flagyl in CDU with CLD and Morphine  No improvement with pain  Escalate antibx to Zosyn  ID consult per GI  Eventual OPT colonoscopy in 6 to 8 weeks once this diverticulitis has resolved.  Tylenol, Morphine, Zofran  IVF    # DM2  insulin regimen    # HTN, HLD  Cont ACEi, BB  Hold HCTZ while hydrating    # Abnormal UA  Urine c/s testing  asymptomatic    # s/p Elevated lactate in ED    Lovenox    Later in the day pt requesting PO antibx and wants to go home.  IT will be an AMA if she leaves, if she stays will get ID consult   55 y/o female with PMH of DM, HLD, HTN presents with 2 day h/o right sided abdominal pain.  CT here showed a mild uncomplicated right sided diverticulitis as well as a cirrhotic appearing liver likely secondary to NAFLD     # Right Abd pain sec to Right Cecal diverticulitis  treated with CIpro + Flagyl in CDU with CLD and Morphine  No improvement with pain  ID consult per GI- Escalate antibx   Eventual OPT colonoscopy in 6 to 8 weeks once this diverticulitis has resolved.  Tylenol, Morphine, Zofran  IVF    # DM2  insulin regimen    # HTN, HLD  Cont ACEi, BB  Hold HCTZ while hydrating    # Abnormal UA  Urine c/s testing  asymptomatic    # s/p Elevated lactate in ED    Lovenox    Later in the day pt requesting PO antibx and wants to go home.  IT will be an AMA if she leaves, if she stays will get ID consult

## 2022-04-03 NOTE — H&P ADULT - HISTORY OF PRESENT ILLNESS
57 y/o female with PMH of DM, HLD, HTN presents with 2 day h/o right sided abdominal pain. Pt is an extremely poor historian. Her son who was with her at the bedside was not helpful either, even though he spoke English well. she reports associated nausea and vomiting x 1 no diarrhea dysuria. CT here showed a mild uncomplicated right sided diverticulitis as well as a cirrhotic appearing liver likely secondary to NAFLD as she is obese and diabetic. She was initially admitted to CDU where she had 1 episode of low grade temp. Today since, pain not improved despite being on antibx, CDU consulted GI who recommended escalating antibx choice and inpt admission    SH- denies habits, lives with family  FH- DM and HTN in family

## 2022-04-03 NOTE — ED ADULT NURSE REASSESSMENT NOTE - NS ED NURSE REASSESS COMMENT FT1
Dr Martinez at bedside for evaluation, pt continues to c/o discomfort, awaiting additional orders, son @ bedside, will continue to monitor.

## 2022-04-04 ENCOUNTER — TRANSCRIPTION ENCOUNTER (OUTPATIENT)
Age: 57
End: 2022-04-04

## 2022-04-04 VITALS
RESPIRATION RATE: 18 BRPM | DIASTOLIC BLOOD PRESSURE: 71 MMHG | HEART RATE: 98 BPM | SYSTOLIC BLOOD PRESSURE: 115 MMHG | TEMPERATURE: 99 F | OXYGEN SATURATION: 96 %

## 2022-04-04 DIAGNOSIS — K74.60 UNSPECIFIED CIRRHOSIS OF LIVER: ICD-10-CM

## 2022-04-04 DIAGNOSIS — R10.9 UNSPECIFIED ABDOMINAL PAIN: ICD-10-CM

## 2022-04-04 LAB
A1C WITH ESTIMATED AVERAGE GLUCOSE RESULT: 8.2 % — HIGH (ref 4–5.6)
ANION GAP SERPL CALC-SCNC: 14 MMOL/L — SIGNIFICANT CHANGE UP (ref 5–17)
BASOPHILS # BLD AUTO: 0.04 K/UL — SIGNIFICANT CHANGE UP (ref 0–0.2)
BASOPHILS NFR BLD AUTO: 0.4 % — SIGNIFICANT CHANGE UP (ref 0–2)
BUN SERPL-MCNC: 3.6 MG/DL — LOW (ref 8–20)
CALCIUM SERPL-MCNC: 8.4 MG/DL — LOW (ref 8.6–10.2)
CHLORIDE SERPL-SCNC: 104 MMOL/L — SIGNIFICANT CHANGE UP (ref 98–107)
CO2 SERPL-SCNC: 19 MMOL/L — LOW (ref 22–29)
CREAT SERPL-MCNC: 0.28 MG/DL — LOW (ref 0.5–1.3)
CULTURE RESULTS: SIGNIFICANT CHANGE UP
EGFR: 126 ML/MIN/1.73M2 — SIGNIFICANT CHANGE UP
EOSINOPHIL # BLD AUTO: 0.1 K/UL — SIGNIFICANT CHANGE UP (ref 0–0.5)
EOSINOPHIL NFR BLD AUTO: 0.9 % — SIGNIFICANT CHANGE UP (ref 0–6)
ESTIMATED AVERAGE GLUCOSE: 189 MG/DL — HIGH (ref 68–114)
GLUCOSE BLDC GLUCOMTR-MCNC: 192 MG/DL — HIGH (ref 70–99)
GLUCOSE BLDC GLUCOMTR-MCNC: 276 MG/DL — HIGH (ref 70–99)
GLUCOSE SERPL-MCNC: 165 MG/DL — HIGH (ref 70–99)
HCT VFR BLD CALC: 36.6 % — SIGNIFICANT CHANGE UP (ref 34.5–45)
HCV AB S/CO SERPL IA: 0.06 S/CO — SIGNIFICANT CHANGE UP (ref 0–0.99)
HCV AB SERPL-IMP: SIGNIFICANT CHANGE UP
HGB BLD-MCNC: 12.5 G/DL — SIGNIFICANT CHANGE UP (ref 11.5–15.5)
IMM GRANULOCYTES NFR BLD AUTO: 0.5 % — SIGNIFICANT CHANGE UP (ref 0–1.5)
LYMPHOCYTES # BLD AUTO: 1.03 K/UL — SIGNIFICANT CHANGE UP (ref 1–3.3)
LYMPHOCYTES # BLD AUTO: 9.3 % — LOW (ref 13–44)
MCHC RBC-ENTMCNC: 30.8 PG — SIGNIFICANT CHANGE UP (ref 27–34)
MCHC RBC-ENTMCNC: 34.2 GM/DL — SIGNIFICANT CHANGE UP (ref 32–36)
MCV RBC AUTO: 90.1 FL — SIGNIFICANT CHANGE UP (ref 80–100)
MONOCYTES # BLD AUTO: 0.78 K/UL — SIGNIFICANT CHANGE UP (ref 0–0.9)
MONOCYTES NFR BLD AUTO: 7.1 % — SIGNIFICANT CHANGE UP (ref 2–14)
NEUTROPHILS # BLD AUTO: 9.02 K/UL — HIGH (ref 1.8–7.4)
NEUTROPHILS NFR BLD AUTO: 81.8 % — HIGH (ref 43–77)
PLATELET # BLD AUTO: 165 K/UL — SIGNIFICANT CHANGE UP (ref 150–400)
POTASSIUM SERPL-MCNC: 3.4 MMOL/L — LOW (ref 3.5–5.3)
POTASSIUM SERPL-SCNC: 3.4 MMOL/L — LOW (ref 3.5–5.3)
RBC # BLD: 4.06 M/UL — SIGNIFICANT CHANGE UP (ref 3.8–5.2)
RBC # FLD: 13.4 % — SIGNIFICANT CHANGE UP (ref 10.3–14.5)
SODIUM SERPL-SCNC: 137 MMOL/L — SIGNIFICANT CHANGE UP (ref 135–145)
SPECIMEN SOURCE: SIGNIFICANT CHANGE UP
WBC # BLD: 11.02 K/UL — HIGH (ref 3.8–10.5)
WBC # FLD AUTO: 11.02 K/UL — HIGH (ref 3.8–10.5)

## 2022-04-04 PROCEDURE — 99239 HOSP IP/OBS DSCHRG MGMT >30: CPT

## 2022-04-04 PROCEDURE — 80053 COMPREHEN METABOLIC PANEL: CPT

## 2022-04-04 PROCEDURE — 81001 URINALYSIS AUTO W/SCOPE: CPT

## 2022-04-04 PROCEDURE — 83605 ASSAY OF LACTIC ACID: CPT

## 2022-04-04 PROCEDURE — 83036 HEMOGLOBIN GLYCOSYLATED A1C: CPT

## 2022-04-04 PROCEDURE — 93005 ELECTROCARDIOGRAM TRACING: CPT

## 2022-04-04 PROCEDURE — 74177 CT ABD & PELVIS W/CONTRAST: CPT | Mod: ME

## 2022-04-04 PROCEDURE — 84295 ASSAY OF SERUM SODIUM: CPT

## 2022-04-04 PROCEDURE — 87637 SARSCOV2&INF A&B&RSV AMP PRB: CPT

## 2022-04-04 PROCEDURE — 85018 HEMOGLOBIN: CPT

## 2022-04-04 PROCEDURE — 87086 URINE CULTURE/COLONY COUNT: CPT

## 2022-04-04 PROCEDURE — 80048 BASIC METABOLIC PNL TOTAL CA: CPT

## 2022-04-04 PROCEDURE — 85027 COMPLETE CBC AUTOMATED: CPT

## 2022-04-04 PROCEDURE — 86803 HEPATITIS C AB TEST: CPT

## 2022-04-04 PROCEDURE — 82435 ASSAY OF BLOOD CHLORIDE: CPT

## 2022-04-04 PROCEDURE — 82962 GLUCOSE BLOOD TEST: CPT

## 2022-04-04 PROCEDURE — 36415 COLL VENOUS BLD VENIPUNCTURE: CPT

## 2022-04-04 PROCEDURE — 99285 EMERGENCY DEPT VISIT HI MDM: CPT | Mod: 25

## 2022-04-04 PROCEDURE — 96374 THER/PROPH/DIAG INJ IV PUSH: CPT

## 2022-04-04 PROCEDURE — 82947 ASSAY GLUCOSE BLOOD QUANT: CPT

## 2022-04-04 PROCEDURE — 85025 COMPLETE CBC W/AUTO DIFF WBC: CPT

## 2022-04-04 PROCEDURE — 99233 SBSQ HOSP IP/OBS HIGH 50: CPT

## 2022-04-04 PROCEDURE — 82330 ASSAY OF CALCIUM: CPT

## 2022-04-04 PROCEDURE — 83690 ASSAY OF LIPASE: CPT

## 2022-04-04 PROCEDURE — 85014 HEMATOCRIT: CPT

## 2022-04-04 PROCEDURE — 84132 ASSAY OF SERUM POTASSIUM: CPT

## 2022-04-04 PROCEDURE — 96375 TX/PRO/DX INJ NEW DRUG ADDON: CPT

## 2022-04-04 PROCEDURE — 82803 BLOOD GASES ANY COMBINATION: CPT

## 2022-04-04 PROCEDURE — G1004: CPT

## 2022-04-04 PROCEDURE — 84484 ASSAY OF TROPONIN QUANT: CPT

## 2022-04-04 RX ORDER — MOXIFLOXACIN HYDROCHLORIDE TABLETS, 400 MG 400 MG/1
1 TABLET, FILM COATED ORAL
Qty: 16 | Refills: 0
Start: 2022-04-04 | End: 2022-04-11

## 2022-04-04 RX ORDER — ACETAMINOPHEN 500 MG
2 TABLET ORAL
Qty: 0 | Refills: 0 | DISCHARGE
Start: 2022-04-04

## 2022-04-04 RX ORDER — PANTOPRAZOLE SODIUM 20 MG/1
1 TABLET, DELAYED RELEASE ORAL
Qty: 20 | Refills: 0
Start: 2022-04-04 | End: 2022-04-23

## 2022-04-04 RX ORDER — METOPROLOL TARTRATE 50 MG
1 TABLET ORAL
Qty: 0 | Refills: 0 | DISCHARGE
Start: 2022-04-04

## 2022-04-04 RX ORDER — SIMVASTATIN 20 MG/1
1 TABLET, FILM COATED ORAL
Qty: 0 | Refills: 0 | DISCHARGE
Start: 2022-04-04

## 2022-04-04 RX ORDER — METRONIDAZOLE 500 MG
1 TABLET ORAL
Qty: 24 | Refills: 0
Start: 2022-04-04 | End: 2022-04-11

## 2022-04-04 RX ADMIN — Medication 650 MILLIGRAM(S): at 00:00

## 2022-04-04 RX ADMIN — Medication 650 MILLIGRAM(S): at 00:40

## 2022-04-04 RX ADMIN — Medication 2: at 07:51

## 2022-04-04 RX ADMIN — MORPHINE SULFATE 1 MILLIGRAM(S): 50 CAPSULE, EXTENDED RELEASE ORAL at 05:41

## 2022-04-04 RX ADMIN — Medication 3 UNIT(S): at 07:51

## 2022-04-04 RX ADMIN — Medication 650 MILLIGRAM(S): at 08:50

## 2022-04-04 RX ADMIN — Medication 50 MILLIGRAM(S): at 05:21

## 2022-04-04 RX ADMIN — Medication 100 MILLIGRAM(S): at 05:20

## 2022-04-04 RX ADMIN — Medication 6: at 11:23

## 2022-04-04 RX ADMIN — Medication 10 MILLIGRAM(S): at 05:21

## 2022-04-04 RX ADMIN — PANTOPRAZOLE SODIUM 40 MILLIGRAM(S): 20 TABLET, DELAYED RELEASE ORAL at 05:21

## 2022-04-04 RX ADMIN — MORPHINE SULFATE 1 MILLIGRAM(S): 50 CAPSULE, EXTENDED RELEASE ORAL at 06:15

## 2022-04-04 RX ADMIN — Medication 200 MILLIGRAM(S): at 05:19

## 2022-04-04 RX ADMIN — Medication 650 MILLIGRAM(S): at 09:15

## 2022-04-04 RX ADMIN — Medication 3 UNIT(S): at 11:22

## 2022-04-04 NOTE — PROGRESS NOTE ADULT - PROBLEM SELECTOR PLAN 2
Incidental finding likely secondary to NAFLD  Continue to monitor LFT's  Patient should follow up with Hepatology Dr Pollock as outpatient. Incidental finding likely secondary to NAFLD  Continue to monitor LFT's  Patient should follow up with Hepatology Dr Canela as outpatient.

## 2022-04-04 NOTE — PROGRESS NOTE ADULT - PROBLEM SELECTOR PLAN 1
Right sided abdominal pain most likely secondary to uncomplicated right sided diverticulitis as seen on CT-scan. Please continue ID recommendations.   Ok to continue Clear liquid diet.  At present no GI interventions indicated, patient will benefit from Outpatient colonoscopy once infection resolves.

## 2022-04-04 NOTE — PROGRESS NOTE ADULT - SUBJECTIVE AND OBJECTIVE BOX
Patient is a 56y old  Female who presents with a chief complaint of right LQ pain (2022 16:23)      INTERVAL HPI/OVERNIGHT EVENTS: Patient seen and evaluated at bedside, reporting intermittent right lower abdominal pain, CT of abdomen and pelvis revealed  Cecal diverticulitis. Cirrhotic liver. Denies nausea, vomiting, chest pain, shortness of breath, hematemesis, hematochezia, melena.                                                           MEDICATIONS  (STANDING):  ciprofloxacin   IVPB 400 milliGRAM(s) IV Intermittent every 12 hours  dextrose 5%. 1000 milliLiter(s) (100 mL/Hr) IV Continuous <Continuous>  dextrose 5%. 1000 milliLiter(s) (50 mL/Hr) IV Continuous <Continuous>  dextrose 50% Injectable 25 Gram(s) IV Push once  dextrose 50% Injectable 12.5 Gram(s) IV Push once  dextrose 50% Injectable 25 Gram(s) IV Push once  enalapril 10 milliGRAM(s) Oral daily  enoxaparin Injectable 40 milliGRAM(s) SubCutaneous every 24 hours  glucagon  Injectable 1 milliGRAM(s) IntraMuscular once  insulin lispro (ADMELOG) corrective regimen sliding scale   SubCutaneous three times a day before meals  insulin lispro Injectable (ADMELOG) 3 Unit(s) SubCutaneous three times a day before meals  lactated ringers. 1000 milliLiter(s) (75 mL/Hr) IV Continuous <Continuous>  metoprolol succinate ER 50 milliGRAM(s) Oral daily  metroNIDAZOLE  IVPB 500 milliGRAM(s) IV Intermittent every 8 hours  pantoprazole    Tablet 40 milliGRAM(s) Oral before breakfast  simvastatin 40 milliGRAM(s) Oral at bedtime    MEDICATIONS  (PRN):  acetaminophen     Tablet .. 650 milliGRAM(s) Oral every 6 hours PRN Mild Pain (1 - 3)  dextrose Oral Gel 15 Gram(s) Oral once PRN Blood Glucose LESS THAN 70 milliGRAM(s)/deciliter  morphine  - Injectable 2 milliGRAM(s) IV Push every 6 hours PRN Severe Pain (7 - 10)  morphine  - Injectable 1 milliGRAM(s) IV Push every 6 hours PRN Moderate Pain (4 - 6)  ondansetron Injectable 4 milliGRAM(s) IV Push every 6 hours PRN Nausea and/or Vomiting      Allergies    penicillin (Unknown)    Intolerances    Review of Systems:  · ENMT: negative  · Respiratory and Thorax: negative  · Cardiovascular: negative  · Gastrointestinal: see above.  · Genitourinary:	negative  · Musculoskeletal: negative  · Neurological: negative  · Psychiatric: negative  · Hematology/Lymphatics: negative  · Endocrine: negative      Vital Signs Last 24 Hrs  T(C): 36.9 (2022 08:12), Max: 37.9 (2022 23:19)  T(F): 98.4 (2022 08:12), Max: 100.3 (2022 23:19)  HR: 98 (2022 08:12) (69 - 113)  BP: 128/73 (2022 08:12) (124/76 - 147/81)  BP(mean): 93 (2022 20:18) (93 - 93)  RR: 18 (2022 08:12) (16 - 20)  SpO2: 96% (2022 08:12) (95% - 96%)    PHYSICAL EXAM:  · Constitutional:  woman, in no acute distress.   · Eyes: EOMI; PERRL; no drainage or redness  · ENMT: No oral lesions; no gross abnormalities  · Neck:	No bruits; no thyromegaly or nodules  · Back:	No deformity or limitation of movement  · Respiratory: Breath Sounds equal & clear to percussion & auscultation, no accessory muscle use  · Cardiovascular: Regular rate & rhythm, normal S1, S2; no murmurs, gallops or rubs; no S3, S4  · Gastrointestinal: Soft, right abdominal tenderness, no hepatosplenomegaly, normal bowel sounds      LABS:                        12.5   11.02 )-----------( 165      ( 2022 03:30 )             36.6     04-04    137  |  104  |  3.6<L>  ----------------------------<  165<H>  3.4<L>   |  19.0<L>  |  0.28<L>    Ca    8.4<L>      2022 03:30    TPro  7.1  /  Alb  4.2  /  TBili  1.0  /  DBili  x   /  AST  66<H>  /  ALT  74<H>  /  AlkPhos  209<H>  04-      Urinalysis Basic - ( 2022 04:55 )    Color: Yellow / Appearance: Clear / S.005 / pH: x  Gluc: x / Ketone: Negative  / Bili: Negative / Urobili: Negative mg/dL   Blood: x / Protein: Negative / Nitrite: Negative   Leuk Esterase: Moderate / RBC: 3-5 /HPF / WBC 11-25 /HPF   Sq Epi: x / Non Sq Epi: Few / Bacteria: Few      LIVER FUNCTIONS - ( 2022 14:24 )  Alb: 4.2 g/dL / Pro: 7.1 g/dL / ALK PHOS: 209 U/L / ALT: 74 U/L / AST: 66 U/L / GGT: x             RADIOLOGY & ADDITIONAL TESTS:  < from: CT Abdomen and Pelvis w/ IV Cont (22 @ 17:45) >  ACC: [id number] EXAM:  CT ABDOMEN AND PELVIS IC                          PROCEDURE DATE:  2022          INTERPRETATION:  CLINICAL INFORMATION: Abdominal pain.    COMPARISON: None.    CONTRAST/COMPLICATIONS:  IV Contrast: Omnipaque 300  97 cc administered   3 cc discarded  Oral Contrast: NONE  Complications: None reported at time of study completion    PROCEDURE:  CT of the Abdomen and Pelvis was performed.  Sagittal and coronal reformats were performed.    FINDINGS:  LOWER CHEST: Mild dependent atelectasis in the right lower lobe.    LIVER: The liver is shrunken and heterogeneous in enhancement with   prominence of the left lobe laterally and caudate lobe and a microadenoma   macronodular contour, compatible with cirrhosis. No discrete liver lesion   is identified. The hepatic veins and portal vein are patent.  BILE DUCTS: Normal caliber.  GALLBLADDER: Tiny layering gallstones. Subtle wall thickening of the   gallbladder fundus, likely related to the adjacent hepatocellular   disease..  SPLEEN: Within normal limits.  PANCREAS: Within normal limits.  ADRENALS: Within normal limits.  KIDNEYS/URETERS: Within normal limits.    BLADDER: Within normal limits.  REPRODUCTIVE ORGANS: Uterus and adnexa are unremarkable by CT criteria.    BOWEL: Colonic diverticulosis. There is mild concentric wall thickening   and hyperenhancement of the cecum and ascending colon with mild stranding   of the adjacent paracolic fat. No extraluminal gas or drainable fluid   collection is identified. There is subtle prominence of the appendiceal   base to 7 mm. The distal appendix is unremarkable.    PERITONEUM: Trace free fluid in the pelvis.  VESSELS: Within normal limits.  RETROPERITONEUM/LYMPH NODES: No lymphadenopathy.  ABDOMINAL WALL: Within normal limits.  BONES: Within normal limits.    IMPRESSION: Cecal diverticulitis. Cirrhotic liver.    < end of copied text >

## 2022-04-04 NOTE — DISCHARGE NOTE NURSING/CASE MANAGEMENT/SOCIAL WORK - PATIENT PORTAL LINK FT
You can access the FollowMyHealth Patient Portal offered by [facility] by registering at the following website: [email or web address]. By joining [facility]’s FollowMyHealth portal, you will also be able to view your health information using other applications (apps) compatible with our system.

## 2022-04-19 PROBLEM — E78.5 HYPERLIPIDEMIA, UNSPECIFIED: Chronic | Status: ACTIVE | Noted: 2022-04-02

## 2022-04-19 PROBLEM — I10 ESSENTIAL (PRIMARY) HYPERTENSION: Chronic | Status: ACTIVE | Noted: 2022-04-02

## 2022-04-19 PROBLEM — E11.9 TYPE 2 DIABETES MELLITUS WITHOUT COMPLICATIONS: Chronic | Status: ACTIVE | Noted: 2022-04-02

## 2022-05-10 ENCOUNTER — NON-APPOINTMENT (OUTPATIENT)
Age: 57
End: 2022-05-10

## 2022-05-10 ENCOUNTER — APPOINTMENT (OUTPATIENT)
Dept: GASTROENTEROLOGY | Facility: CLINIC | Age: 57
End: 2022-05-10
Payer: COMMERCIAL

## 2022-05-10 VITALS
SYSTOLIC BLOOD PRESSURE: 126 MMHG | BODY MASS INDEX: 31.41 KG/M2 | HEART RATE: 96 BPM | WEIGHT: 160 LBS | TEMPERATURE: 97.5 F | DIASTOLIC BLOOD PRESSURE: 93 MMHG | OXYGEN SATURATION: 98 % | RESPIRATION RATE: 14 BRPM | HEIGHT: 60 IN

## 2022-05-10 DIAGNOSIS — Z78.9 OTHER SPECIFIED HEALTH STATUS: ICD-10-CM

## 2022-05-10 DIAGNOSIS — Z83.3 FAMILY HISTORY OF DIABETES MELLITUS: ICD-10-CM

## 2022-05-10 DIAGNOSIS — Z09 ENCOUNTER FOR FOLLOW-UP EXAMINATION AFTER COMPLETED TREATMENT FOR CONDITIONS OTHER THAN MALIGNANT NEOPLASM: ICD-10-CM

## 2022-05-10 DIAGNOSIS — Z86.39 PERSONAL HISTORY OF OTHER ENDOCRINE, NUTRITIONAL AND METABOLIC DISEASE: ICD-10-CM

## 2022-05-10 DIAGNOSIS — Z86.79 PERSONAL HISTORY OF OTHER DISEASES OF THE CIRCULATORY SYSTEM: ICD-10-CM

## 2022-05-10 PROCEDURE — 99203 OFFICE O/P NEW LOW 30 MIN: CPT

## 2022-05-10 RX ORDER — METOPROLOL TARTRATE 50 MG/1
50 TABLET, FILM COATED ORAL
Refills: 0 | Status: ACTIVE | COMMUNITY

## 2022-05-10 RX ORDER — METFORMIN HYDROCHLORIDE 1000 MG/1
1000 TABLET, COATED ORAL
Refills: 0 | Status: ACTIVE | COMMUNITY

## 2022-05-10 RX ORDER — ENALAPRIL MALEATE 10 MG/1
10 TABLET ORAL
Refills: 0 | Status: ACTIVE | COMMUNITY

## 2022-05-10 RX ORDER — SIMVASTATIN 40 MG/1
40 TABLET, FILM COATED ORAL
Refills: 0 | Status: ACTIVE | COMMUNITY

## 2022-05-10 RX ORDER — PANTOPRAZOLE SODIUM 40 MG/10ML
40 INJECTION, POWDER, FOR SOLUTION INTRAVENOUS
Refills: 0 | Status: ACTIVE | COMMUNITY

## 2022-05-10 RX ORDER — GLIMEPIRIDE 4 MG/1
4 TABLET ORAL
Refills: 0 | Status: ACTIVE | COMMUNITY

## 2022-05-10 NOTE — HISTORY OF PRESENT ILLNESS
[de-identified] : 56-year-old woman medical history significant for diabetes, hypertension, hyperlipidemia, NAFLD admitted to the hospital April 2 through 4 for diverticulitis, UTI.\par Hospital labs significant for elevated LFTs and elevated white count which normalized on discharge.  Not anemic.\par Here for follow-up.\par Complaining of pain on the right side and some change in her bowel pattern.  She states that started approximately a year ago.  It seems to be largely constipation though at times there is diarrhea as well.\par She lost 10 pounds in the hospital but has subsequently started to regain some.\par She denies any blood in the stool\par She denies any family history of colon cancer\par She states she has had 2 colonoscopies in the past but does not remember the results or where they were done.  She will try to call to get the records.  She also states she had an endoscopy she is not sure what the findings were of any of the procedures.\par \par \par 4/2 Ct:\par LIVER: The liver is shrunken and heterogeneous in enhancement with \par prominence of the left lobe laterally and caudate lobe and a microadenoma \par macronodular contour, compatible with cirrhosis. No discrete liver lesion \par is identified. The hepatic veins and portal vein are patent.\par BILE DUCTS: Normal caliber.\par GALLBLADDER: Tiny layering gallstones. Subtle wall thickening of the \par gallbladder fundus, likely related to the adjacent hepatocellular \par disease..\par SPLEEN: Within normal limits.\par PANCREAS: Within normal limits.\par ADRENALS: Within normal limits.\par KIDNEYS/URETERS: Within normal limits.\par \par BLADDER: Within normal limits.\par REPRODUCTIVE ORGANS: Uterus and adnexa are unremarkable by CT criteria.\par \par BOWEL: Colonic diverticulosis. There is mild concentric wall thickening \par and hyperenhancement of the cecum and ascending colon with mild stranding \par of the adjacent paracolic fat. No extraluminal gas or drainable fluid \par collection is identified. There is subtle prominence of the appendiceal \par base to 7 mm. The distal appendix is unremarkable.\par

## 2022-05-10 NOTE — ASSESSMENT
[FreeTextEntry1] : The 6-year-old lady history of diabetes, hypertension, hyperlipidemia, fatty liver here for follow-up after hospitalization for diverticulitis.

## 2022-06-05 ENCOUNTER — TRANSCRIPTION ENCOUNTER (OUTPATIENT)
Age: 57
End: 2022-06-05

## 2022-06-06 ENCOUNTER — APPOINTMENT (OUTPATIENT)
Dept: GASTROENTEROLOGY | Facility: GI CENTER | Age: 57
End: 2022-06-06
Payer: COMMERCIAL

## 2022-06-06 ENCOUNTER — OUTPATIENT (OUTPATIENT)
Dept: OUTPATIENT SERVICES | Facility: HOSPITAL | Age: 57
LOS: 1 days | End: 2022-06-06
Payer: COMMERCIAL

## 2022-06-06 DIAGNOSIS — R10.31 RIGHT LOWER QUADRANT PAIN: ICD-10-CM

## 2022-06-06 DIAGNOSIS — K57.90 DIVERTICULOSIS OF INTESTINE, PART UNSPECIFIED, W/OUT PERFORATION OR ABSCESS W/OUT BLEEDING: ICD-10-CM

## 2022-06-06 DIAGNOSIS — Z98.891 HISTORY OF UTERINE SCAR FROM PREVIOUS SURGERY: Chronic | ICD-10-CM

## 2022-06-06 LAB — GLUCOSE BLDC GLUCOMTR-MCNC: 133 MG/DL — HIGH (ref 70–99)

## 2022-06-06 PROCEDURE — 45378 DIAGNOSTIC COLONOSCOPY: CPT

## 2022-06-06 PROCEDURE — 82962 GLUCOSE BLOOD TEST: CPT

## 2022-06-06 PROCEDURE — G0121: CPT

## 2022-07-28 ENCOUNTER — APPOINTMENT (OUTPATIENT)
Dept: GASTROENTEROLOGY | Facility: CLINIC | Age: 57
End: 2022-07-28

## 2022-07-28 VITALS
HEART RATE: 83 BPM | DIASTOLIC BLOOD PRESSURE: 78 MMHG | WEIGHT: 158 LBS | SYSTOLIC BLOOD PRESSURE: 118 MMHG | RESPIRATION RATE: 16 BRPM | BODY MASS INDEX: 31.02 KG/M2 | HEIGHT: 60 IN | OXYGEN SATURATION: 98 % | TEMPERATURE: 98.7 F

## 2022-07-28 DIAGNOSIS — R74.8 ABNORMAL LEVELS OF OTHER SERUM ENZYMES: ICD-10-CM

## 2022-07-28 PROCEDURE — 99205 OFFICE O/P NEW HI 60 MIN: CPT

## 2022-07-28 RX ORDER — PANTOPRAZOLE 40 MG/1
40 TABLET, DELAYED RELEASE ORAL
Qty: 20 | Refills: 0 | Status: ACTIVE | COMMUNITY
Start: 2022-04-04

## 2022-07-28 RX ORDER — METRONIDAZOLE 500 MG/1
500 TABLET ORAL
Qty: 24 | Refills: 0 | Status: ACTIVE | COMMUNITY
Start: 2022-04-04

## 2022-07-28 RX ORDER — DAPAGLIFLOZIN AND METFORMIN HYDROCHLORIDE 5; 1000 MG/1; MG/1
5-1000 TABLET, FILM COATED, EXTENDED RELEASE ORAL
Qty: 60 | Refills: 0 | Status: ACTIVE | COMMUNITY
Start: 2022-03-04

## 2022-07-28 RX ORDER — CIPROFLOXACIN HYDROCHLORIDE 500 MG/1
500 TABLET, FILM COATED ORAL
Qty: 14 | Refills: 0 | Status: ACTIVE | COMMUNITY
Start: 2022-03-04

## 2022-07-28 RX ORDER — PREDNISONE 50 MG/1
50 TABLET ORAL
Qty: 3 | Refills: 0 | Status: ACTIVE | COMMUNITY
Start: 2022-06-20

## 2022-07-28 RX ORDER — NITROFURANTOIN MACROCRYSTALS 100 MG/1
100 CAPSULE ORAL
Qty: 14 | Refills: 0 | Status: ACTIVE | COMMUNITY
Start: 2022-06-17

## 2022-07-28 RX ORDER — FLASH GLUCOSE SENSOR
KIT MISCELLANEOUS
Qty: 2 | Refills: 0 | Status: ACTIVE | COMMUNITY
Start: 2022-03-04

## 2022-07-28 RX ORDER — GLIMEPIRIDE 2 MG/1
2 TABLET ORAL
Qty: 30 | Refills: 0 | Status: ACTIVE | COMMUNITY
Start: 2022-02-28

## 2022-07-28 RX ORDER — FLUTICASONE PROPIONATE 50 UG/1
50 SPRAY, METERED NASAL
Qty: 16 | Refills: 0 | Status: ACTIVE | COMMUNITY
Start: 2022-03-07

## 2022-07-28 RX ORDER — TRIAMCINOLONE ACETONIDE 5 MG/G
0.5 CREAM TOPICAL
Qty: 15 | Refills: 0 | Status: ACTIVE | COMMUNITY
Start: 2022-03-25

## 2022-07-28 RX ORDER — ENALAPRIL MALEATE AND HYDROCHLOROTHIAZIDE 10; 25 MG/1; MG/1
10-25 TABLET ORAL
Qty: 90 | Refills: 0 | Status: ACTIVE | COMMUNITY
Start: 2022-02-28

## 2022-07-28 RX ORDER — METOPROLOL SUCCINATE 50 MG/1
50 TABLET, EXTENDED RELEASE ORAL
Qty: 90 | Refills: 0 | Status: ACTIVE | COMMUNITY
Start: 2021-08-19

## 2022-07-28 RX ORDER — MONTELUKAST 10 MG/1
10 TABLET, FILM COATED ORAL
Qty: 90 | Refills: 0 | Status: ACTIVE | COMMUNITY
Start: 2022-04-16

## 2022-07-28 RX ORDER — POLYETHYLENE GLYCOL 3350 17 G/17G
17 POWDER, FOR SOLUTION ORAL
Qty: 1 | Refills: 0 | Status: DISCONTINUED | COMMUNITY
Start: 2022-05-10 | End: 2022-07-28

## 2022-07-28 NOTE — PHYSICAL EXAM
[Tender] : tender [Smooth] : smooth [General Appearance - Alert] : alert [General Appearance - In No Acute Distress] : in no acute distress [Sclera] : the sclera and conjunctiva were normal [Neck Appearance] : the appearance of the neck was normal [Jugular Venous Distention Increased] : there was no jugular-venous distention [Neck Cervical Mass (___cm)] : no neck mass was observed [Thyroid Diffuse Enlargement] : the thyroid was not enlarged [Thyroid Nodule] : there were no palpable thyroid nodules [Auscultation Breath Sounds / Voice Sounds] : lungs were clear to auscultation bilaterally [Heart Rate And Rhythm] : heart rate was normal and rhythm regular [Edema] : there was no peripheral edema [Bowel Sounds] : normal bowel sounds [Abdomen Soft] : soft [Abdomen Mass (___ Cm)] : no abdominal mass palpated [Abdomen Hernia] : no hernia was discovered [Abnormal Walk] : normal gait [Nail Clubbing] : no clubbing  or cyanosis of the fingernails [Musculoskeletal - Swelling] : no joint swelling seen [Motor Tone] : muscle strength and tone were normal [Skin Color & Pigmentation] : normal skin color and pigmentation [Skin Turgor] : normal skin turgor [] : no rash [No Focal Deficits] : no focal deficits [Oriented To Time, Place, And Person] : oriented to person, place, and time [Impaired Insight] : insight and judgment were intact [Affect] : the affect was normal [Scleral Icterus] : No Scleral Icterus [Hepatojugular Reflux] : patient did not have a sustained hepatojugular reflux [Spider Angioma] : No spider angioma(s) were observed [Abdominal  Ascites] : no ascites [Splenomegaly] : no splenomegaly [Asterixis] : no asterixis observed [Jaundice] : No jaundice [Palmar Erythema] : no Palmar Erythema [Depression] : no depression [Hallucinations] : ~T no ~M hallucinations [Delusions] : no ~T delusions [Suicidal Ideation] : no suicidal ideation [Homicidal Ideation] : no homicidal ideations [Preoccupiation With Violent Thoughts] : no preoccupation with violent thoughts

## 2022-07-28 NOTE — ADDENDUM
[FreeTextEntry1] : I, Darlene Snow NP, acted as scribe for RHONDA Feliciano for this patient encounter.

## 2022-07-28 NOTE — HISTORY OF PRESENT ILLNESS
[MELD Score: ___] : MELD Score is [unfilled] [de-identified] : LESLEY LUNA is a 56 year old  female with a high waist circumference and PMH significant for HTN, HLD, DM, and NAFLD. She presents today for cirrhosis.\par \par Pt presented to [facility] on 4/2/22 with complaints abdominal pain and was diagnosed with diverticulitis and a UTI. At that time a CT AP was done which incidentally found cirrhotic liver. Her LFTs on 4/2/22 were: bilirubin 1.0, AST 66, ALT 74, AlkPhos 209. \par Labs done 5/20/22: bilirubin 0.7, AST 86, ALT 78, AlkPhos 240, INR 1.11\par Hepatitis B and C serologies negative.\par \par Pt was born in [facility]. Denies alcohol consumption. Reports eating ricinus seeds as a child. Denies risk factors for viral hepatitis. Denies autoimmune disease, fatigue, malaise, arthralgias, myalgias, pruritus, recent infection, abdominal pain or distension, jaundice, hematemesis, hematochezia, dark urine, confusion, unintentional weight loss or gain. Denies family history of liver disease, sudden death or late trimester abortions.\par \par 4/2/22 CT of the Abdomen and Pelvis was performed.\par \par FINDINGS:\par LOWER CHEST: Mild dependent atelectasis in the right lower lobe.\par \par LIVER: The liver is shrunken and heterogeneous in enhancement with \par prominence of the left lobe laterally and caudate lobe and a microadenoma \par macronodular contour, compatible with cirrhosis. No discrete liver lesion \par is identified. The hepatic veins and portal vein are patent.\par BILE DUCTS: Normal caliber.\par GALLBLADDER: Tiny layering gallstones. Subtle wall thickening of the \par gallbladder fundus, likely related to the adjacent hepatocellular \par disease..\par SPLEEN: Within normal limits.\par PANCREAS: Within normal limits.\par ADRENALS: Within normal limits.\par KIDNEYS/URETERS: Within normal limits.\par \par BLADDER: Within normal limits.\par REPRODUCTIVE ORGANS: Uterus and adnexa are unremarkable by CT criteria.\par \par BOWEL: Colonic diverticulosis. There is mild concentric wall thickening \par and hyperenhancement of the cecum and ascending colon with mild stranding \par of the adjacent paracolic fat. No extraluminal gas or drainable fluid \par collection is identified. There is subtle prominence of the appendiceal \par base to 7 mm. The distal appendix is unremarkable.\par \par PERITONEUM: Trace free fluid in the pelvis.\par VESSELS: Within normal limits.\par RETROPERITONEUM/LYMPH NODES: No lymphadenopathy.\par ABDOMINAL WALL: Within normal limits.\par BONES: Within normal limits.\par \par IMPRESSION: Cecal diverticulitis. Cirrhotic liver.

## 2022-07-28 NOTE — HISTORY OF PRESENT ILLNESS
[MELD Score: ___] : MELD Score is [unfilled] [de-identified] : LESLEY LUNA is a 56 year old  female with a high waist circumference and PMH significant for HTN, HLD, DM, and NAFLD. She presents today for cirrhosis.\par \par Pt presented to [facility] on 4/2/22 with complaints abdominal pain and was diagnosed with diverticulitis and a UTI. At that time a CT AP was done which incidentally found cirrhotic liver. Her LFTs on 4/2/22 were: bilirubin 1.0, AST 66, ALT 74, AlkPhos 209. \par Labs done 5/20/22: bilirubin 0.7, AST 86, ALT 78, AlkPhos 240, INR 1.11\par Hepatitis B and C serologies negative.\par \par Pt was born in [facility]. Denies alcohol consumption. Reports eating ricinus seeds as a child. Denies risk factors for viral hepatitis. Denies autoimmune disease, fatigue, malaise, arthralgias, myalgias, pruritus, recent infection, abdominal pain or distension, jaundice, hematemesis, hematochezia, dark urine, confusion, unintentional weight loss or gain. Denies family history of liver disease, sudden death or late trimester abortions.\par \par 4/2/22 CT of the Abdomen and Pelvis was performed.\par \par FINDINGS:\par LOWER CHEST: Mild dependent atelectasis in the right lower lobe.\par \par LIVER: The liver is shrunken and heterogeneous in enhancement with \par prominence of the left lobe laterally and caudate lobe and a microadenoma \par macronodular contour, compatible with cirrhosis. No discrete liver lesion \par is identified. The hepatic veins and portal vein are patent.\par BILE DUCTS: Normal caliber.\par GALLBLADDER: Tiny layering gallstones. Subtle wall thickening of the \par gallbladder fundus, likely related to the adjacent hepatocellular \par disease..\par SPLEEN: Within normal limits.\par PANCREAS: Within normal limits.\par ADRENALS: Within normal limits.\par KIDNEYS/URETERS: Within normal limits.\par \par BLADDER: Within normal limits.\par REPRODUCTIVE ORGANS: Uterus and adnexa are unremarkable by CT criteria.\par \par BOWEL: Colonic diverticulosis. There is mild concentric wall thickening \par and hyperenhancement of the cecum and ascending colon with mild stranding \par of the adjacent paracolic fat. No extraluminal gas or drainable fluid \par collection is identified. There is subtle prominence of the appendiceal \par base to 7 mm. The distal appendix is unremarkable.\par \par PERITONEUM: Trace free fluid in the pelvis.\par VESSELS: Within normal limits.\par RETROPERITONEUM/LYMPH NODES: No lymphadenopathy.\par ABDOMINAL WALL: Within normal limits.\par BONES: Within normal limits.\par \par IMPRESSION: Cecal diverticulitis. Cirrhotic liver.

## 2022-07-28 NOTE — PHYSICAL EXAM
[Tender] : tender [Smooth] : smooth [General Appearance - Alert] : alert [General Appearance - In No Acute Distress] : in no acute distress [Sclera] : the sclera and conjunctiva were normal [Neck Appearance] : the appearance of the neck was normal [Neck Cervical Mass (___cm)] : no neck mass was observed [Jugular Venous Distention Increased] : there was no jugular-venous distention [Thyroid Diffuse Enlargement] : the thyroid was not enlarged [Thyroid Nodule] : there were no palpable thyroid nodules [Auscultation Breath Sounds / Voice Sounds] : lungs were clear to auscultation bilaterally [Heart Rate And Rhythm] : heart rate was normal and rhythm regular [Edema] : there was no peripheral edema [Bowel Sounds] : normal bowel sounds [Abdomen Soft] : soft [Abdomen Mass (___ Cm)] : no abdominal mass palpated [Abdomen Hernia] : no hernia was discovered [Abnormal Walk] : normal gait [Nail Clubbing] : no clubbing  or cyanosis of the fingernails [Musculoskeletal - Swelling] : no joint swelling seen [Motor Tone] : muscle strength and tone were normal [Skin Color & Pigmentation] : normal skin color and pigmentation [Skin Turgor] : normal skin turgor [] : no rash [No Focal Deficits] : no focal deficits [Oriented To Time, Place, And Person] : oriented to person, place, and time [Impaired Insight] : insight and judgment were intact [Affect] : the affect was normal [Scleral Icterus] : No Scleral Icterus [Hepatojugular Reflux] : patient did not have a sustained hepatojugular reflux [Spider Angioma] : No spider angioma(s) were observed [Abdominal  Ascites] : no ascites [Splenomegaly] : no splenomegaly [Asterixis] : no asterixis observed [Jaundice] : No jaundice [Palmar Erythema] : no Palmar Erythema [Depression] : no depression [Hallucinations] : ~T no ~M hallucinations [Delusions] : no ~T delusions [Suicidal Ideation] : no suicidal ideation [Homicidal Ideation] : no homicidal ideations [Preoccupiation With Violent Thoughts] : no preoccupation with violent thoughts

## 2022-07-28 NOTE — ASSESSMENT
[FreeTextEntry1] : LESLEY LUNA is a 56 year old  female with a high waist circumference and PMH significant for HTN, HLD, DM, and NAFLD. She presents today for cirrhosis.\par \par Disease progression of cirrhosis discussed, including but not limited to hepatocellular carcinoma, varices with or without bleeding, hepatic encephalopathy, ascites, liver failure and death. \par \par HCC Screening\par -I have explained the need for imaging every 6 months to assess for HCC.\par -CT triphasic with and without contrast done 4/2/22, next due October 2022\par -AFP ordered\par \par Hepatitis\par -labs ordered\par \par Variceal Screening\par -Last EGD over 15 years ago\par -GI referral given for EGD to evaluate for varices\par -if pt experiences hematemesis, advised to go to ER immediately\par \par Encephalopathy\par -notify provider if new onset confusion\par -at least 1-2 bms/day\par \par Ascites\par -none at this time, will continue to monitor\par \par Diet\par -High Protein Low Fat Low Sugar Low Salt (up to 2G Na/day) Diet\par -No prolonged fasting\par -Exercise and weight loss (lose 10% of current weight)\par -Mediterranean Diet info provided\par -No alcohol consumption\par \par Pain\par -NO NSAIDS!!\par -Can take up to 2G Tylenol per day\par \par Transplant\par -I have explained that disease can progress to the point of requiring an evaluation for liver transplantation. \par -MELDNa 12\par \par Follow Up\par Labs ordered to rule out competing etiologies of liver disease.\par -Follow up in 4-6 weeks with labs or sooner if needed\par

## 2022-08-04 DIAGNOSIS — Z00.00 ENCOUNTER FOR GENERAL ADULT MEDICAL EXAMINATION W/OUT ABNORMAL FINDINGS: ICD-10-CM

## 2022-08-04 NOTE — REASON FOR VISIT
[Consultation] : a consultation visit [Initial Evaluation] : an initial evaluation [Family Member] : family member [Ad Hoc ] : provided by an ad hoc  [Interpreters_FullName] : Anderson [Interpreters_Relationshiptopatient] : son [TWNoteComboBox1] : [place] [FreeTextEntry1] : cirrhosis

## 2022-08-04 NOTE — REASON FOR VISIT
[Consultation] : a consultation visit [Initial Evaluation] : an initial evaluation [Family Member] : family member [Ad Hoc ] : provided by an ad hoc  [Interpreters_FullName] : Anderson [Interpreters_Relationshiptopatient] : son [TWNoteComboBox1] : [place] [FreeTextEntry1] : cirrhosis

## 2022-10-24 LAB — SARS-COV-2 N GENE NPH QL NAA+PROBE: NOT DETECTED

## 2022-10-26 ENCOUNTER — TRANSCRIPTION ENCOUNTER (OUTPATIENT)
Age: 57
End: 2022-10-26

## 2022-10-27 ENCOUNTER — OUTPATIENT (OUTPATIENT)
Dept: OUTPATIENT SERVICES | Facility: HOSPITAL | Age: 57
LOS: 1 days | End: 2022-10-27
Payer: COMMERCIAL

## 2022-10-27 ENCOUNTER — APPOINTMENT (OUTPATIENT)
Dept: GASTROENTEROLOGY | Facility: GI CENTER | Age: 57
End: 2022-10-27

## 2022-10-27 ENCOUNTER — RESULT REVIEW (OUTPATIENT)
Age: 57
End: 2022-10-27

## 2022-10-27 DIAGNOSIS — K74.60 UNSPECIFIED CIRRHOSIS OF LIVER: ICD-10-CM

## 2022-10-27 DIAGNOSIS — Z98.891 HISTORY OF UTERINE SCAR FROM PREVIOUS SURGERY: Chronic | ICD-10-CM

## 2022-10-27 LAB — GLUCOSE BLDC GLUCOMTR-MCNC: 152 MG/DL — HIGH (ref 70–99)

## 2022-10-27 PROCEDURE — 43239 EGD BIOPSY SINGLE/MULTIPLE: CPT

## 2022-10-27 PROCEDURE — 88342 IMHCHEM/IMCYTCHM 1ST ANTB: CPT | Mod: 26

## 2022-10-27 PROCEDURE — 88305 TISSUE EXAM BY PATHOLOGIST: CPT

## 2022-10-27 PROCEDURE — 88342 IMHCHEM/IMCYTCHM 1ST ANTB: CPT

## 2022-10-27 PROCEDURE — 82962 GLUCOSE BLOOD TEST: CPT

## 2022-10-27 PROCEDURE — 88305 TISSUE EXAM BY PATHOLOGIST: CPT | Mod: 26

## 2022-10-31 NOTE — HISTORY OF PRESENT ILLNESS
[FreeTextEntry1] : 56 F h/o ZAYAS cirrhosis, diverticulosis, DM, HTN, HL here for variceal screening. \par Colon with me 6/22 s/p -it is, 10 y f/u L sided tics.

## 2022-11-01 LAB — SURGICAL PATHOLOGY STUDY: SIGNIFICANT CHANGE UP

## 2023-03-08 ENCOUNTER — OFFICE (OUTPATIENT)
Dept: URBAN - METROPOLITAN AREA CLINIC 116 | Facility: CLINIC | Age: 58
Setting detail: OPHTHALMOLOGY
End: 2023-03-08
Payer: COMMERCIAL

## 2023-03-08 DIAGNOSIS — E11.9: ICD-10-CM

## 2023-03-08 DIAGNOSIS — H25.13: ICD-10-CM

## 2023-03-08 DIAGNOSIS — H04.123: ICD-10-CM

## 2023-03-08 DIAGNOSIS — H35.033: ICD-10-CM

## 2023-03-08 PROBLEM — H52.4 PRESBYOPIA: Status: ACTIVE | Noted: 2023-03-08

## 2023-03-08 PROCEDURE — 92014 COMPRE OPH EXAM EST PT 1/>: CPT | Performed by: OPTOMETRIST

## 2023-03-08 PROCEDURE — 92250 FUNDUS PHOTOGRAPHY W/I&R: CPT | Performed by: OPTOMETRIST

## 2023-03-08 ASSESSMENT — KERATOMETRY
OD_K1POWER_DIOPTERS: 43.75
OD_K2POWER_DIOPTERS: 44.75
OS_AXISANGLE_DEGREES: 105
OS_K2POWER_DIOPTERS: 44.50
OS_K1POWER_DIOPTERS: 44.00
OD_AXISANGLE_DEGREES: 080

## 2023-03-08 ASSESSMENT — REFRACTION_CURRENTRX
OS_SPHERE: +1.50
OD_SPHERE: +1.50
OS_CYLINDER: SPH
OD_CYLINDER: SPH
OD_VPRISM_DIRECTION: SV
OD_OVR_VA: 20/
OD_CYLINDER: SPH
OS_SPHERE: +1.50
OD_OVR_VA: 20/
OS_VPRISM_DIRECTION: SV
OS_OVR_VA: 20/
OS_CYLINDER: SPH
OS_OVR_VA: 20/
OD_SPHERE: +1.50

## 2023-03-08 ASSESSMENT — REFRACTION_AUTOREFRACTION
OD_SPHERE: +0.50
OD_CYLINDER: -0.75
OS_AXIS: 050
OD_AXIS: 125
OS_SPHERE: +0.25
OS_CYLINDER: -0.50

## 2023-03-08 ASSESSMENT — TEAR BREAK UP TIME (TBUT)
OD_TBUT: 1+
OS_TBUT: 1+

## 2023-03-08 ASSESSMENT — REFRACTION_MANIFEST
OD_VA1: 20/25
OS_SPHERE: PLANO
OS_ADD: +1.75
OS_SPHERE: PLANO
OS_ADD: +1.50
OD_ADD: +1.50
OD_VA1: 20/20
OD_SPHERE: PLANO
OS_VA1: 20/25
OS_VA1: 20/20
OD_SPHERE: PLANO
OD_VA1: 20/20
OS_SPHERE: PLANO
OD_SPHERE: PLANO
OS_VA1: 20/20
OS_ADD: +2.00
OD_ADD: +2.00
OD_ADD: +1.75

## 2023-03-08 ASSESSMENT — SPHEQUIV_DERIVED
OS_SPHEQUIV: 0
OD_SPHEQUIV: 0.125

## 2023-03-08 ASSESSMENT — LID EXAM ASSESSMENTS
OS_BLEPHARITIS: +1
OD_BLEPHARITIS: +1

## 2023-03-08 ASSESSMENT — AXIALLENGTH_DERIVED
OS_AL: 23.3196
OD_AL: 23.2721

## 2023-03-08 ASSESSMENT — TONOMETRY
OD_IOP_MMHG: 18
OS_IOP_MMHG: 17

## 2023-03-08 ASSESSMENT — VISUAL ACUITY
OD_BCVA: 20/20
OS_BCVA: 20/20

## 2023-03-08 ASSESSMENT — CONFRONTATIONAL VISUAL FIELD TEST (CVF)
OS_FINDINGS: FULL
OD_FINDINGS: FULL

## 2024-05-15 ENCOUNTER — RX ONLY (RX ONLY)
Age: 59
End: 2024-05-15

## 2024-05-15 ENCOUNTER — OFFICE (OUTPATIENT)
Dept: URBAN - METROPOLITAN AREA CLINIC 116 | Facility: CLINIC | Age: 59
Setting detail: OPHTHALMOLOGY
End: 2024-05-15
Payer: COMMERCIAL

## 2024-05-15 DIAGNOSIS — H02.89: ICD-10-CM

## 2024-05-15 DIAGNOSIS — H25.13: ICD-10-CM

## 2024-05-15 DIAGNOSIS — H35.033: ICD-10-CM

## 2024-05-15 PROBLEM — H10.433 ALLERGIC CONJUNCTIVITIS; BOTH EYES: Status: ACTIVE | Noted: 2024-05-15

## 2024-05-15 PROCEDURE — 92014 COMPRE OPH EXAM EST PT 1/>: CPT | Performed by: OPTOMETRIST

## 2024-05-15 PROCEDURE — 92250 FUNDUS PHOTOGRAPHY W/I&R: CPT | Performed by: OPTOMETRIST

## 2024-05-15 ASSESSMENT — CONFRONTATIONAL VISUAL FIELD TEST (CVF)
OS_FINDINGS: FULL
OD_FINDINGS: FULL

## 2024-05-15 ASSESSMENT — LID EXAM ASSESSMENTS
OS_BLEPHARITIS: +1
OD_BLEPHARITIS: +1

## 2024-06-12 ENCOUNTER — OFFICE (OUTPATIENT)
Dept: URBAN - METROPOLITAN AREA CLINIC 116 | Facility: CLINIC | Age: 59
Setting detail: OPHTHALMOLOGY
End: 2024-06-12
Payer: COMMERCIAL

## 2024-06-12 DIAGNOSIS — H10.433: ICD-10-CM

## 2024-06-12 PROCEDURE — 99213 OFFICE O/P EST LOW 20 MIN: CPT | Performed by: OPTOMETRIST

## 2024-06-12 ASSESSMENT — LID EXAM ASSESSMENTS
OS_BLEPHARITIS: +1
OD_BLEPHARITIS: +1

## 2024-06-12 ASSESSMENT — CONFRONTATIONAL VISUAL FIELD TEST (CVF)
OS_FINDINGS: FULL
OD_FINDINGS: FULL

## 2024-06-26 ENCOUNTER — OFFICE (OUTPATIENT)
Dept: URBAN - METROPOLITAN AREA CLINIC 116 | Facility: CLINIC | Age: 59
Setting detail: OPHTHALMOLOGY
End: 2024-06-26
Payer: COMMERCIAL

## 2024-06-26 DIAGNOSIS — H10.433: ICD-10-CM

## 2024-06-26 PROCEDURE — 99212 OFFICE O/P EST SF 10 MIN: CPT | Performed by: OPTOMETRIST

## 2024-06-26 ASSESSMENT — CONFRONTATIONAL VISUAL FIELD TEST (CVF)
OD_FINDINGS: FULL
OS_FINDINGS: FULL

## 2024-06-26 ASSESSMENT — LID EXAM ASSESSMENTS
OS_BLEPHARITIS: +1
OD_BLEPHARITIS: +1

## (undated) DEVICE — STERIS DEFENDO 3-PIECE KIT (AIR/WATER, SUCTION & BIOPSY VALVES)

## (undated) DEVICE — FORCEP ENDOJAW AGTR LC W NDL 2.8MM 230CM DISP

## (undated) DEVICE — VALVE ENDOSCOPE DEFENDO SINGLE USE